# Patient Record
Sex: FEMALE | URBAN - METROPOLITAN AREA
[De-identification: names, ages, dates, MRNs, and addresses within clinical notes are randomized per-mention and may not be internally consistent; named-entity substitution may affect disease eponyms.]

---

## 2019-06-20 ENCOUNTER — APPOINTMENT (OUTPATIENT)
Age: 54
Setting detail: DERMATOLOGY
End: 2019-06-25

## 2019-06-20 VITALS — HEIGHT: 61 IN | WEIGHT: 165 LBS | RESPIRATION RATE: 16 BRPM

## 2019-06-20 DIAGNOSIS — B07.8 OTHER VIRAL WARTS: ICD-10-CM

## 2019-06-20 DIAGNOSIS — L91.0 HYPERTROPHIC SCAR: ICD-10-CM

## 2019-06-20 DIAGNOSIS — D22 MELANOCYTIC NEVI: ICD-10-CM

## 2019-06-20 DIAGNOSIS — M67.4 GANGLION: ICD-10-CM

## 2019-06-20 DIAGNOSIS — L57.0 ACTINIC KERATOSIS: ICD-10-CM

## 2019-06-20 DIAGNOSIS — L81.4 OTHER MELANIN HYPERPIGMENTATION: ICD-10-CM

## 2019-06-20 PROBLEM — L11.1 TRANSIENT ACANTHOLYTIC DERMATOSIS [GROVER]: Status: ACTIVE | Noted: 2019-06-20

## 2019-06-20 PROBLEM — D48.5 NEOPLASM OF UNCERTAIN BEHAVIOR OF SKIN: Status: ACTIVE | Noted: 2019-06-20

## 2019-06-20 PROBLEM — D22.5 MELANOCYTIC NEVI OF TRUNK: Status: ACTIVE | Noted: 2019-06-20

## 2019-06-20 PROBLEM — M67.431 GANGLION, RIGHT WRIST: Status: ACTIVE | Noted: 2019-06-20

## 2019-06-20 PROCEDURE — 17000 DESTRUCT PREMALG LESION: CPT | Mod: 59

## 2019-06-20 PROCEDURE — 99214 OFFICE O/P EST MOD 30 MIN: CPT | Mod: 25

## 2019-06-20 PROCEDURE — OTHER INTRALESIONAL KENALOG: OTHER

## 2019-06-20 PROCEDURE — OTHER COUNSELING: OTHER

## 2019-06-20 PROCEDURE — 56605 BIOPSY OF VULVA/PERINEUM: CPT

## 2019-06-20 PROCEDURE — 17003 DESTRUCT PREMALG LES 2-14: CPT | Mod: 59

## 2019-06-20 PROCEDURE — 88305 TISSUE EXAM BY PATHOLOGIST: CPT

## 2019-06-20 PROCEDURE — OTHER LIQUID NITROGEN: OTHER

## 2019-06-20 PROCEDURE — 11900 INJECT SKIN LESIONS </W 7: CPT | Mod: 59

## 2019-06-20 PROCEDURE — 11102 TANGNTL BX SKIN SINGLE LES: CPT | Mod: 59

## 2019-06-20 PROCEDURE — OTHER PATHOLOGY BILLING: OTHER

## 2019-06-20 PROCEDURE — OTHER BIOPSY BY SHAVE METHOD: OTHER

## 2019-06-20 PROCEDURE — 17110 DESTRUCT B9 LESION 1-14: CPT

## 2019-06-20 ASSESSMENT — LOCATION SIMPLE DESCRIPTION DERM
LOCATION SIMPLE: LEFT FOREHEAD
LOCATION SIMPLE: LEFT UPPER BACK
LOCATION SIMPLE: RIGHT PRETIBIAL REGION
LOCATION SIMPLE: LEFT SMALL FINGER
LOCATION SIMPLE: GROIN
LOCATION SIMPLE: RIGHT WRIST
LOCATION SIMPLE: RIGHT CHEEK
LOCATION SIMPLE: LEFT PRETIBIAL REGION
LOCATION SIMPLE: LEFT RING FINGER
LOCATION SIMPLE: LEFT POSTERIOR UPPER ARM
LOCATION SIMPLE: RIGHT ANTERIOR NECK

## 2019-06-20 ASSESSMENT — LOCATION DETAILED DESCRIPTION DERM
LOCATION DETAILED: RIGHT PROXIMAL PRETIBIAL REGION
LOCATION DETAILED: LEFT SUPERIOR UPPER BACK
LOCATION DETAILED: RIGHT VENTRAL WRIST
LOCATION DETAILED: LEFT PROXIMAL POSTERIOR UPPER ARM
LOCATION DETAILED: LEFT PROXIMAL RADIAL DORSAL SMALL FINGER
LOCATION DETAILED: RIGHT INFERIOR ANTERIOR NECK
LOCATION DETAILED: LEFT FOREHEAD
LOCATION DETAILED: LEFT PROXIMAL PRETIBIAL REGION
LOCATION DETAILED: MONS PUBIS
LOCATION DETAILED: LEFT DISTAL PALMAR RING FINGER
LOCATION DETAILED: LEFT SMALL FINGER PROXIMAL INTERPHALANGEAL JOINT
LOCATION DETAILED: RIGHT LATERAL BUCCAL CHEEK

## 2019-06-20 ASSESSMENT — LOCATION ZONE DERM
LOCATION ZONE: TRUNK
LOCATION ZONE: VULVA
LOCATION ZONE: FINGER
LOCATION ZONE: LEG
LOCATION ZONE: FACE
LOCATION ZONE: NECK
LOCATION ZONE: ARM

## 2019-06-20 NOTE — PROCEDURE: LIQUID NITROGEN
Detail Level: Detailed
Medical Necessity Clause: This procedure was medically necessary because the lesions that were treated were:
Render Note In Bullet Format When Appropriate: No
Consent: Verbal and written consent was obtained, and risks were reviewed prior to procedure today. Risks discussed include but are not limited to pain, crusting, scabbing, blistering, scarring, temporary or permanent darker or lighter pigmentary change, recurrence, incomplete resolution, and infection.
Post-Care Instructions: Patient was instructed to avoid picking at any of the treated lesions. Should any lesions treated today not be resolved within 8 weeks or if not certain, then return to clinic for re-evaluation.
Include Z78.9 (Other Specified Conditions Influencing Health Status) As An Associated Diagnosis?: Yes
Consent: Discussed that these are a result of cumulative sun exposure. Verbal and written consent was obtained, and risks were reviewed prior to procedure today. Risks discussed include but are not limited to pain, crusting, scabbing, blistering, scarring, temporary or permanent darker or lighter pigmentary change, recurrence, incomplete resolution, and infection.
Medical Necessity Information: It is in your best interest to select a reason for this procedure from the list below. All of these items fulfill various CMS LCD requirements except the new and changing color options.
Duration Of Freeze Thaw-Cycle (Seconds): 0
Post-Care Instructions: Avoid picking at any of the treated lesions.

## 2019-06-20 NOTE — PROCEDURE: COUNSELING
Patient Specific Counseling (Will Not Stick From Patient To Patient): - This keloid is from a surgical treatment of a skin cancer BCC.\\n- Recommend cortisone injection today.

## 2019-06-20 NOTE — PROCEDURE: COUNSELING
Patient Specific Counseling (Will Not Stick From Patient To Patient): - Refer to ortho doctor. Reassured benign.\\n- Patient expressed understanding.

## 2019-06-20 NOTE — PROCEDURE: PATHOLOGY BILLING
Immunohistochemistry ([id number] and [id number]) billing is not performed here. Please use the Immunohistochemistry Stain Billing plan to accomplish this. Immunohistochemistry ([id number] and [id number]) billing is not performed here. Please use the Immunohistochemistry Stain Billing plan to accomplish this.

## 2019-06-20 NOTE — PROCEDURE: COUNSELING
Patient Specific Counseling (Will Not Stick From Patient To Patient): - Discussed and reassured benign, discussed etiology, treatment options and prognosis of Westhoff’s disease.\\n- If this is itchy, she may use the Betamethasone lotion she has at home to these areas. If not bothersome, may leave alone.\\n- Patient expressed understanding. Patient Specific Counseling (Will Not Stick From Patient To Patient): - Discussed and reassured benign, discussed etiology, treatment options and prognosis of Drayton’s disease.\\n- If this is itchy, she may use the Betamethasone lotion she has at home to these areas. If not bothersome, may leave alone.\\n- Patient expressed understanding.

## 2019-06-20 NOTE — PROCEDURE: COUNSELING
Patient Specific Counseling (Will Not Stick From Patient To Patient): - Recommend treatment of ln2 to the warts.

## 2019-06-20 NOTE — PROCEDURE: BIOPSY BY SHAVE METHOD
Biopsy Type: H and E
Additional Anesthesia Volume In Cc (Will Not Render If 0): 0
Type Of Destruction Used: Curettage
Electrodesiccation Text: The wound bed was treated with electrodesiccation after the biopsy was performed.
Detail Level: Detailed
Biopsy Method: Dermablade
Hemostasis: Drysol
Silver Nitrate Text: The wound bed was treated with silver nitrate after the biopsy was performed.
Electrodesiccation And Curettage Text: The wound bed was treated with electrodesiccation and curettage after the biopsy was performed.
Post-Care Instructions: WOUND CARE:\\nDo NOT submerge wound in a bath, swimming pool, or hot tub for at least 1 week or for as long as there is an open wound. Gently cleanse the site daily with mild soap and water. Be careful NOT to allow a forceful stream of water to hit the biopsy site. After cleaning/showering, apply a thin layer of petrolatum ointment or Aquaphor in the wound followed by an adhesive bandage. Continue this process daily until healed. \\n\\nBLEEDING:\\nIf you develop persistent bleeding, apply firm and steady pressure over the dressing with gauze for 15 minutes. If bleeding persists, reapply pressure with an ice pack over the gauze for 15 minutes. NEVER APPLY ICE DIRECTLY TO THE SKIN. Do NOT peek under the gauze during these 15 minutes of pressure.  Call our office at [phone] if you cannot get the bleeding to stop. \\n\\nINFECTION:\\nSigns of infection may include increasing rather than decreasing pain (after the first few days), increasing redness/swelling/heat, draining pus, pink/red streaks around the wound, and fever or chills.  Please call our office immediately at [phone] if infection is suspected. It is normal to have some mild redness on or around the wound edges; this will lighten day by day and will become less tender.\\n\\nPAIN:\\nPain is usually minimal, but if needed you may take acetaminophen (Tylenol) every four hours as needed. Applying an ice pack over the dressing for 15-20 minutes every 2-3 hours will relieve swelling, lessen pain, and help minimize bruising. NEVER APPLY ICE DIRECTLY TO THE SKIN. Avoid ibuprofen (Advil, Motrin) and naproxen (Aleve) for the first 48 hours as these can increase bleeding.\\n\\nSWELLIG AND BRUISING:\\nSwelling and bruising are common and temporary, usually lasting 1 - 2 weeks. It is more common in areas treated around the eyes, hands, and feet. In the days following the procedure, swelling and bruising can be minimized by keeping the affected areas elevated when possible, reducing salty foods, and applying ice packs over the dressing for 15-20 minutes every 2-3 hours. NEVER APPLY ICE DIRECTLY TO THE SKIN.\\n\\nITCHING:\\nItchiness on and around the wound is very common and can last several days to weeks after surgery. Mild itch is normal as the wound is healing. \\n\\nNERVE CHANGES:\\nNumbness is usually temporary, but it may last for several weeks to months. You may also experience sharp pains at the wound sight as it heals.  Mild pain is normal and will gradually improve with time.\\n \\nNO SMOKING:\\nSmoking will delay the healing process. If you smoke, we recommend trying to quit or at minimum reduce how much you smoke following a procedure.
Bill For Surgical Tray: no
Notification Instructions: - It can take up to 2 weeks to get a biopsy result. \\n- Please refrain from calling to request results until after 2 weeks.
Cryotherapy Text: The wound bed was treated with cryotherapy after the biopsy was performed.
Anesthesia Type: 2% lidocaine with epinephrine
Path Notes Override (Will Replace All Of The Above Text): NROD
Depth Of Biopsy: dermis
Wound Care: Petrolatum
Consent: - Verbal and written consent was obtained and risks were reviewed prior to procedure today. \\n- Risks discussed include but are not limited to scarring, infection, bleeding, scabbing, incomplete removal, nerve damage, pain, and allergy to anesthesia.
Billing Type: Third-Party Bill
Anesthesia Volume In Cc (Will Not Render If 0): 0.3
Dressing: bandage
Post-Care Instructions: WOUND CARE:\\nDo NOT submerge wound in a bath, swimming pool, or hot tub for at least 1 week or for as long as there is an open wound. Gently cleanse the site daily with mild soap and water. Be careful NOT to allow a forceful stream of water to hit the biopsy site. After cleaning/showering, apply a thin layer of petrolatum ointment or Aquaphor in the wound followed by an adhesive bandage. Continue this process daily until healed. \\n\\nBLEEDING:\\nIf you develop persistent bleeding, apply firm and steady pressure over the dressing with gauze for 15 minutes. If bleeding persists, reapply pressure with an ice pack over the gauze for 15 minutes. NEVER APPLY ICE DIRECTLY TO THE SKIN. Do NOT peek under the gauze during these 15 minutes of pressure.  Call our office at [phone] if you cannot get the bleeding to stop. \\n\\nINFECTION:\\nSigns of infection may include increasing rather than decreasing pain (after the first few days), increasing redness/swelling/heat, draining pus, pink/red streaks around the wound, and fever or chills.  Please call our office immediately at [phone] if infection is suspected. It is normal to have some mild redness on or around the wound edges; this will lighten day by day and will become less tender.\\n\\nPAIN:\\nPain is usually minimal, but if needed you may take acetaminophen (Tylenol) every four hours as needed. Applying an ice pack over the dressing for 15-20 minutes every 2-3 hours will relieve swelling, lessen pain, and help minimize bruising. NEVER APPLY ICE DIRECTLY TO THE SKIN. Avoid ibuprofen (Advil, Motrin) and naproxen (Aleve) for the first 48 hours as these can increase bleeding.\\n\\nSWELLIG AND BRUISING:\\nSwelling and bruising are common and temporary, usually lasting 1 - 2 weeks. It is more common in areas treated around the eyes, hands, and feet. In the days following the procedure, swelling and bruising can be minimized by keeping the affected areas elevated when possible, reducing salty foods, and applying ice packs over the dressing for 15-20 minutes every 2-3 hours. NEVER APPLY ICE DIRECTLY TO THE SKIN.\\n\\nITCHING:\\nItchiness on and around the wound is very common and can last several days to weeks after surgery. Mild itch is normal as the wound is healing. \\n\\nNERVE CHANGES:\\nNumbness is usually temporary, but it may last for several weeks to months. You may also experience sharp pains at the wound sight as it heals.  Mild pain is normal and will gradually improve with time.\\n \\nNO SMOKING:\\nSmoking will delay the healing process. If you smoke, we recommend trying to quit or at minimum reduce how much you smoke following a procedure.
Was A Bandage Applied: Yes
Curettage Text: The wound bed was treated with curettage after the biopsy was performed.
Path Notes Override (Will Replace All Of The Above Text): NROD

## 2019-12-27 ENCOUNTER — APPOINTMENT (OUTPATIENT)
Age: 54
Setting detail: DERMATOLOGY
End: 2019-12-31

## 2019-12-27 VITALS — RESPIRATION RATE: 14 BRPM | HEIGHT: 61 IN | WEIGHT: 164 LBS

## 2019-12-27 DIAGNOSIS — L82.0 INFLAMED SEBORRHEIC KERATOSIS: ICD-10-CM

## 2019-12-27 DIAGNOSIS — Z80.8 FAMILY HISTORY OF MALIGNANT NEOPLASM OF OTHER ORGANS OR SYSTEMS: ICD-10-CM

## 2019-12-27 DIAGNOSIS — D22 MELANOCYTIC NEVI: ICD-10-CM

## 2019-12-27 DIAGNOSIS — L73.8 OTHER SPECIFIED FOLLICULAR DISORDERS: ICD-10-CM

## 2019-12-27 DIAGNOSIS — L81.4 OTHER MELANIN HYPERPIGMENTATION: ICD-10-CM

## 2019-12-27 DIAGNOSIS — L40.4 GUTTATE PSORIASIS: ICD-10-CM

## 2019-12-27 DIAGNOSIS — Z85.828 PERSONAL HISTORY OF OTHER MALIGNANT NEOPLASM OF SKIN: ICD-10-CM

## 2019-12-27 PROBLEM — D22.5 MELANOCYTIC NEVI OF TRUNK: Status: ACTIVE | Noted: 2019-12-27

## 2019-12-27 PROCEDURE — OTHER BENIGN DESTRUCTION: OTHER

## 2019-12-27 PROCEDURE — OTHER COUNSELING: OTHER

## 2019-12-27 PROCEDURE — 17110 DESTRUCT B9 LESION 1-14: CPT

## 2019-12-27 PROCEDURE — OTHER REASSURANCE: OTHER

## 2019-12-27 PROCEDURE — OTHER PRESCRIPTION: OTHER

## 2019-12-27 PROCEDURE — 99214 OFFICE O/P EST MOD 30 MIN: CPT | Mod: 25

## 2019-12-27 RX ORDER — CLOBETASOL PROPIONATE 0.5 MG/G
0.05% CREAM TOPICAL BID
Qty: 1 | Refills: 0 | Status: ERX | COMMUNITY
Start: 2019-12-27

## 2019-12-27 ASSESSMENT — LOCATION DETAILED DESCRIPTION DERM
LOCATION DETAILED: RIGHT CLAVICULAR SKIN
LOCATION DETAILED: RIGHT LATERAL FOREHEAD
LOCATION DETAILED: LEFT AXILLARY VAULT
LOCATION DETAILED: RIGHT SUPERIOR UPPER BACK
LOCATION DETAILED: LEFT PROXIMAL PRETIBIAL REGION
LOCATION DETAILED: LEFT SUPERIOR UPPER BACK
LOCATION DETAILED: LEFT POSTERIOR AXILLA
LOCATION DETAILED: RIGHT SUPERIOR MEDIAL MIDBACK

## 2019-12-27 ASSESSMENT — LOCATION ZONE DERM
LOCATION ZONE: TRUNK
LOCATION ZONE: FACE
LOCATION ZONE: LEG
LOCATION ZONE: AXILLAE

## 2019-12-27 ASSESSMENT — LOCATION SIMPLE DESCRIPTION DERM
LOCATION SIMPLE: RIGHT CLAVICULAR SKIN
LOCATION SIMPLE: LEFT PRETIBIAL REGION
LOCATION SIMPLE: LEFT UPPER BACK
LOCATION SIMPLE: RIGHT FOREHEAD
LOCATION SIMPLE: LEFT AXILLARY VAULT
LOCATION SIMPLE: RIGHT UPPER BACK
LOCATION SIMPLE: LEFT POSTERIOR AXILLA
LOCATION SIMPLE: RIGHT LOWER BACK

## 2019-12-27 NOTE — PROCEDURE: REASSURANCE
Detail Level: Generalized
Hide Additional Notes?: No
Detail Level: Detailed
Additional Notes (Optional): Monitor

## 2019-12-27 NOTE — PROCEDURE: BENIGN DESTRUCTION
Add 52 Modifier (Optional): no
Medical Necessity Information: It is in your best interest to select a reason for this procedure from the list below. All of these items fulfill various CMS LCD requirements except the new and changing color options.
Post-Care Instructions: I reviewed with the patient in detail post-care instructions. Patient is to wear sunprotection, and avoid picking at any of the treated lesions. Pt may apply Vaseline to crusted or scabbing areas.
Medical Necessity Clause: This procedure was medically necessary because the lesions that were treated were:
Consent: The patient's consent was obtained including but not limited to risks of crusting, scabbing, blistering, scarring, darker or lighter pigmentary change, recurrence, incomplete removal and infection.
Anesthesia Volume In Cc: 0.5
Detail Level: Zone
Total Number Of Lesions Treated: 6

## 2020-09-10 ENCOUNTER — APPOINTMENT (OUTPATIENT)
Dept: URBAN - METROPOLITAN AREA CLINIC 254 | Age: 55
Setting detail: DERMATOLOGY
End: 2020-09-10

## 2020-09-10 VITALS — WEIGHT: 153 LBS | RESPIRATION RATE: 16 BRPM | HEIGHT: 61 IN

## 2020-09-10 DIAGNOSIS — L82.1 OTHER SEBORRHEIC KERATOSIS: ICD-10-CM

## 2020-09-10 DIAGNOSIS — Z85.828 PERSONAL HISTORY OF OTHER MALIGNANT NEOPLASM OF SKIN: ICD-10-CM

## 2020-09-10 DIAGNOSIS — D22 MELANOCYTIC NEVI: ICD-10-CM

## 2020-09-10 DIAGNOSIS — L81.4 OTHER MELANIN HYPERPIGMENTATION: ICD-10-CM

## 2020-09-10 DIAGNOSIS — L663 OTHER SPECIFIED DISEASES OF HAIR AND HAIR FOLLICLES: ICD-10-CM

## 2020-09-10 DIAGNOSIS — L82.0 INFLAMED SEBORRHEIC KERATOSIS: ICD-10-CM

## 2020-09-10 DIAGNOSIS — D18.0 HEMANGIOMA: ICD-10-CM

## 2020-09-10 DIAGNOSIS — L91.8 OTHER HYPERTROPHIC DISORDERS OF THE SKIN: ICD-10-CM

## 2020-09-10 DIAGNOSIS — L73.8 OTHER SPECIFIED FOLLICULAR DISORDERS: ICD-10-CM

## 2020-09-10 PROBLEM — D18.01 HEMANGIOMA OF SKIN AND SUBCUTANEOUS TISSUE: Status: ACTIVE | Noted: 2020-09-10

## 2020-09-10 PROBLEM — D22.5 MELANOCYTIC NEVI OF TRUNK: Status: ACTIVE | Noted: 2020-09-10

## 2020-09-10 PROBLEM — L30.9 DERMATITIS, UNSPECIFIED: Status: ACTIVE | Noted: 2020-09-10

## 2020-09-10 PROCEDURE — 99214 OFFICE O/P EST MOD 30 MIN: CPT

## 2020-09-10 PROCEDURE — OTHER BENIGN DESTRUCTION COSMETIC: OTHER

## 2020-09-10 PROCEDURE — OTHER COUNSELING: OTHER

## 2020-09-10 ASSESSMENT — LOCATION DETAILED DESCRIPTION DERM
LOCATION DETAILED: RIGHT AXILLARY VAULT
LOCATION DETAILED: SUPERIOR THORACIC SPINE
LOCATION DETAILED: LEFT AXILLARY VAULT
LOCATION DETAILED: LEFT LOWER CUTANEOUS LIP
LOCATION DETAILED: RIGHT INFERIOR MEDIAL FOREHEAD
LOCATION DETAILED: LEFT AXILLARY VAULT
LOCATION DETAILED: EPIGASTRIC SKIN
LOCATION DETAILED: RIGHT MEDIAL UPPER BACK
LOCATION DETAILED: UPPER STERNUM
LOCATION DETAILED: LEFT INFERIOR ANTERIOR NECK
LOCATION DETAILED: LEFT SUPERIOR UPPER BACK
LOCATION DETAILED: RIGHT AXILLARY VAULT
LOCATION DETAILED: RIGHT MEDIAL SUPERIOR CHEST

## 2020-09-10 ASSESSMENT — LOCATION ZONE DERM
LOCATION ZONE: AXILLAE
LOCATION ZONE: TRUNK
LOCATION ZONE: AXILLAE
LOCATION ZONE: FACE
LOCATION ZONE: LIP
LOCATION ZONE: NECK

## 2020-09-10 ASSESSMENT — LOCATION SIMPLE DESCRIPTION DERM
LOCATION SIMPLE: LEFT ANTERIOR NECK
LOCATION SIMPLE: UPPER BACK
LOCATION SIMPLE: RIGHT AXILLARY VAULT
LOCATION SIMPLE: RIGHT FOREHEAD
LOCATION SIMPLE: CHEST
LOCATION SIMPLE: ABDOMEN
LOCATION SIMPLE: RIGHT UPPER BACK
LOCATION SIMPLE: RIGHT AXILLARY VAULT
LOCATION SIMPLE: LEFT AXILLARY VAULT
LOCATION SIMPLE: LEFT LIP
LOCATION SIMPLE: LEFT AXILLARY VAULT
LOCATION SIMPLE: LEFT UPPER BACK

## 2020-09-10 NOTE — PROCEDURE: COUNSELING
Detail Level: Zone
Detail Level: Detailed
Patient Specific Counseling (Will Not Stick From Patient To Patient): -Recommend using Dial antibacterial soap for a few weeks to wash the underarms.\\n-RTC in 4 weeks to recheck this rash.

## 2020-09-10 NOTE — PROCEDURE: BENIGN DESTRUCTION COSMETIC
Post-Care Instructions: I reviewed with the patient in detail post-care instructions. Patient is to wear sunprotection, and avoid picking at any of the treated lesions. Pt may apply Vaseline to crusted or scabbing areas.
Detail Level: Detailed
Consent: The patient's consent was obtained including but not limited to risks of crusting, scabbing, blistering, scarring, darker or lighter pigmentary change, recurrence, incomplete removal and infection.
Anesthesia Volume In Cc: 0.5
Price (Use Numbers Only, No Special Characters Or $): 125

## 2021-06-03 ENCOUNTER — APPOINTMENT (OUTPATIENT)
Dept: URBAN - METROPOLITAN AREA CLINIC 254 | Age: 56
Setting detail: DERMATOLOGY
End: 2021-06-06

## 2021-06-03 VITALS — RESPIRATION RATE: 14 BRPM | WEIGHT: 164 LBS | HEIGHT: 61 IN

## 2021-06-03 DIAGNOSIS — Z80.8 FAMILY HISTORY OF MALIGNANT NEOPLASM OF OTHER ORGANS OR SYSTEMS: ICD-10-CM

## 2021-06-03 DIAGNOSIS — L81.4 OTHER MELANIN HYPERPIGMENTATION: ICD-10-CM

## 2021-06-03 DIAGNOSIS — D22 MELANOCYTIC NEVI: ICD-10-CM

## 2021-06-03 DIAGNOSIS — L82.0 INFLAMED SEBORRHEIC KERATOSIS: ICD-10-CM

## 2021-06-03 DIAGNOSIS — Z85.828 PERSONAL HISTORY OF OTHER MALIGNANT NEOPLASM OF SKIN: ICD-10-CM

## 2021-06-03 DIAGNOSIS — Z71.89 OTHER SPECIFIED COUNSELING: ICD-10-CM

## 2021-06-03 DIAGNOSIS — L82.1 OTHER SEBORRHEIC KERATOSIS: ICD-10-CM

## 2021-06-03 PROBLEM — D48.5 NEOPLASM OF UNCERTAIN BEHAVIOR OF SKIN: Status: ACTIVE | Noted: 2021-06-03

## 2021-06-03 PROBLEM — D22.5 MELANOCYTIC NEVI OF TRUNK: Status: ACTIVE | Noted: 2021-06-03

## 2021-06-03 PROCEDURE — 88305 TISSUE EXAM BY PATHOLOGIST: CPT

## 2021-06-03 PROCEDURE — OTHER PATHOLOGY BILLING: OTHER

## 2021-06-03 PROCEDURE — OTHER COUNSELING: OTHER

## 2021-06-03 PROCEDURE — OTHER BIOPSY BY SHAVE METHOD: OTHER

## 2021-06-03 PROCEDURE — 17110 DESTRUCT B9 LESION 1-14: CPT

## 2021-06-03 PROCEDURE — OTHER LIQUID NITROGEN: OTHER

## 2021-06-03 PROCEDURE — 11102 TANGNTL BX SKIN SINGLE LES: CPT | Mod: 59

## 2021-06-03 PROCEDURE — 99213 OFFICE O/P EST LOW 20 MIN: CPT | Mod: 25

## 2021-06-03 ASSESSMENT — LOCATION SIMPLE DESCRIPTION DERM
LOCATION SIMPLE: RIGHT SHOULDER
LOCATION SIMPLE: LEFT UPPER BACK
LOCATION SIMPLE: RIGHT ANTERIOR NECK
LOCATION SIMPLE: RIGHT BACK
LOCATION SIMPLE: RIGHT THIGH
LOCATION SIMPLE: RIGHT UPPER BACK
LOCATION SIMPLE: CHEST

## 2021-06-03 ASSESSMENT — LOCATION DETAILED DESCRIPTION DERM
LOCATION DETAILED: UPPER STERNUM
LOCATION DETAILED: RIGHT SUPERIOR LATERAL UPPER BACK
LOCATION DETAILED: RIGHT INFERIOR ANTERIOR NECK
LOCATION DETAILED: RIGHT MEDIAL UPPER BACK
LOCATION DETAILED: LEFT SUPERIOR UPPER BACK
LOCATION DETAILED: RIGHT ANTERIOR SHOULDER
LOCATION DETAILED: RIGHT ANTERIOR PROXIMAL THIGH

## 2021-06-03 ASSESSMENT — LOCATION ZONE DERM
LOCATION ZONE: ARM
LOCATION ZONE: LEG
LOCATION ZONE: TRUNK
LOCATION ZONE: NECK

## 2021-06-03 NOTE — PROCEDURE: LIQUID NITROGEN
Render Note In Bullet Format When Appropriate: Yes
Detail Level: Detailed
Medical Necessity Information: It is in your best interest to select a reason for this procedure from the list below. All of these items fulfill various CMS LCD requirements except the new and changing color options.
Medical Necessity Clause: This procedure was medically necessary because the lesions that were treated were:
Consent: - Verbal and written consent was obtained, and risks were reviewed prior to procedure today. \\n- Risks discussed include but are not limited to pain, crusting, scabbing, blistering, scarring, temporary or permanent darker or lighter pigmentary change, recurrence, incomplete resolution, and infection.
Post-Care Instructions: - Avoid picking at any of the treated lesions.
Include Z78.9 (Other Specified Conditions Influencing Health Status) As An Associated Diagnosis?: No

## 2021-06-03 NOTE — PROCEDURE: PATHOLOGY BILLING
Immunohistochemistry ([id number] and [id number]) billing is not performed here. Please use the Immunohistochemistry Stain Billing plan to accomplish this. Immunohistochemistry ([id number] and [id number]) billing is not performed here. Please use the Immunohistochemistry Stain Billing plan to accomplish this.

## 2021-06-03 NOTE — PROCEDURE: BIOPSY BY SHAVE METHOD
Curettage Text: The wound bed was treated with curettage after the biopsy was performed.
Bill [id number] For Specimen Handling/Conveyance To Laboratory?: no
Hide Additional Anticipated Plan?: Yes
Path Notes Override (Will Replace All Of The Above Text): NROD
Biopsy Method: Dermablade
Electrodesiccation Text: The wound bed was treated with electrodesiccation after the biopsy was performed.
Depth Of Biopsy: dermis
Detail Level: Detailed
Silver Nitrate Text: The wound bed was treated with silver nitrate after the biopsy was performed.
Wound Care: Petrolatum
Biopsy Type: H and E
Dressing: bandage
Billing Type: Client Bill
Notification Instructions: - It can take up to 2 weeks to get a biopsy result. \\n- Please refrain from calling to request results until after 2 weeks.
Electrodesiccation And Curettage Text: The wound bed was treated with electrodesiccation and curettage after the biopsy was performed.
Anesthesia Type: 2% lidocaine with epinephrine
Additional Anesthesia Volume In Cc (Will Not Render If 0): 0
Anesthesia Volume In Cc (Will Not Render If 0): 0.5
Information: Selecting Yes will display possible errors in your note based on the variables you have selected. This validation is only offered as a suggestion for you. PLEASE NOTE THAT THE VALIDATION TEXT WILL BE REMOVED WHEN YOU FINALIZE YOUR NOTE. IF YOU WANT TO FAX A PRELIMINARY NOTE YOU WILL NEED TO TOGGLE THIS TO 'NO' IF YOU DO NOT WANT IT IN YOUR FAXED NOTE.
Cryotherapy Text: The wound bed was treated with cryotherapy after the biopsy was performed.
Hemostasis: Aluminum Chloride
Post-Care Instructions: WOUND CARE:\\nDo NOT submerge wound in a bath, swimming pool, or hot tub for at least 1 week or for as long as there is an open wound. Gently cleanse the site daily with mild soap and water. Be careful NOT to allow a forceful stream of water to hit the biopsy site. After cleaning/showering, apply a thin layer of petrolatum ointment or Aquaphor in the wound followed by an adhesive bandage. Continue this process daily until healed. \\n\\nBLEEDING:\\nIf you develop persistent bleeding, apply firm and steady pressure over the dressing with gauze for 15 minutes. If bleeding persists, reapply pressure with an ice pack over the gauze for 15 minutes. NEVER APPLY ICE DIRECTLY TO THE SKIN. Do NOT peek under the gauze during these 15 minutes of pressure.  Call our office at [phone] if you cannot get the bleeding to stop. \\n\\nINFECTION:\\nSigns of infection may include increasing rather than decreasing pain (after the first few days), increasing redness/swelling/heat, draining pus, pink/red streaks around the wound, and fever or chills.  Please call our office immediately at [phone] if infection is suspected. It is normal to have some mild redness on or around the wound edges; this will lighten day by day and will become less tender.\\n\\nPAIN:\\nPain is usually minimal, but if needed you may take acetaminophen (Tylenol) every four hours as needed. Applying an ice pack over the dressing for 15-20 minutes every 2-3 hours will relieve swelling, lessen pain, and help minimize bruising. NEVER APPLY ICE DIRECTLY TO THE SKIN. Avoid ibuprofen (Advil, Motrin) and naproxen (Aleve) for the first 48 hours as these can increase bleeding.\\n\\nSWELLIG AND BRUISING:\\nSwelling and bruising are common and temporary, usually lasting 1 - 2 weeks. It is more common in areas treated around the eyes, hands, and feet. In the days following the procedure, swelling and bruising can be minimized by keeping the affected areas elevated when possible, reducing salty foods, and applying ice packs over the dressing for 15-20 minutes every 2-3 hours. NEVER APPLY ICE DIRECTLY TO THE SKIN.\\n\\nITCHING:\\nItchiness on and around the wound is very common and can last several days to weeks after surgery. Mild itch is normal as the wound is healing. \\n\\nNERVE CHANGES:\\nNumbness is usually temporary, but it may last for several weeks to months. You may also experience sharp pains at the wound sight as it heals.  Mild pain is normal and will gradually improve with time.\\n \\nNO SMOKING:\\nSmoking will delay the healing process. If you smoke, we recommend trying to quit or at minimum reduce how much you smoke following a procedure.
Type Of Destruction Used: Electrodesiccation
Consent: - Verbal and written consent was obtained and risks were reviewed prior to procedure today. \\n- Risks discussed include but are not limited to scarring, infection, bleeding, scabbing, incomplete removal, nerve damage, pain, and allergy to anesthesia.

## 2021-06-03 NOTE — PROCEDURE: PATHOLOGY BILLING
Rendering Text In Billing: The slides will be read by [facility] and reported in the attached document. Rendering Text In Billing: The slides will be read by [facility] and reported in the attached document.

## 2021-06-03 NOTE — PROCEDURE: COUNSELING
Patient Specific Counseling (Will Not Stick From Patient To Patient): - Discussed that this nevus has atypical features that warrant a biopsy.\\n- Patient expressed understanding.\\n- Follow-up for re-examination for regimentation or an additional removal procedure may become necessary depending the pathologist's report.

## 2022-05-03 ENCOUNTER — APPOINTMENT (OUTPATIENT)
Dept: URBAN - METROPOLITAN AREA CLINIC 252 | Age: 57
Setting detail: DERMATOLOGY
End: 2022-05-03

## 2022-05-03 VITALS — HEIGHT: 61 IN | WEIGHT: 165 LBS

## 2022-05-03 DIAGNOSIS — L40.0 PSORIASIS VULGARIS: ICD-10-CM

## 2022-05-03 DIAGNOSIS — D22 MELANOCYTIC NEVI: ICD-10-CM

## 2022-05-03 DIAGNOSIS — L82.1 OTHER SEBORRHEIC KERATOSIS: ICD-10-CM

## 2022-05-03 PROBLEM — L30.9 DERMATITIS, UNSPECIFIED: Status: ACTIVE | Noted: 2022-05-03

## 2022-05-03 PROBLEM — D22.5 MELANOCYTIC NEVI OF TRUNK: Status: ACTIVE | Noted: 2022-05-03

## 2022-05-03 PROCEDURE — 11104 PUNCH BX SKIN SINGLE LESION: CPT

## 2022-05-03 PROCEDURE — OTHER PRESCRIPTION: OTHER

## 2022-05-03 PROCEDURE — OTHER COUNSELING: OTHER

## 2022-05-03 PROCEDURE — OTHER BIOPSY BY PUNCH METHOD: OTHER

## 2022-05-03 PROCEDURE — OTHER ORDER TESTS: OTHER

## 2022-05-03 PROCEDURE — 99213 OFFICE O/P EST LOW 20 MIN: CPT | Mod: 25

## 2022-05-03 PROCEDURE — OTHER OTEZLA INITIATION: OTHER

## 2022-05-03 RX ORDER — TRIAMCINOLONE ACETONIDE 1 MG/G
0.1% CREAM TOPICAL BID
Qty: 454 | Refills: 1 | Status: ERX | COMMUNITY
Start: 2022-05-03

## 2022-05-03 ASSESSMENT — LOCATION DETAILED DESCRIPTION DERM
LOCATION DETAILED: LEFT MID-UPPER BACK
LOCATION DETAILED: LEFT PROXIMAL DORSAL FOREARM
LOCATION DETAILED: RIGHT DISTAL DORSAL FOREARM
LOCATION DETAILED: RIGHT ANTERIOR PROXIMAL THIGH
LOCATION DETAILED: LEFT INFERIOR LATERAL MALAR CHEEK
LOCATION DETAILED: PERIUMBILICAL SKIN
LOCATION DETAILED: LEFT MEDIAL UPPER BACK
LOCATION DETAILED: RIGHT SUPERIOR UPPER BACK
LOCATION DETAILED: LEFT ANTERIOR PROXIMAL THIGH
LOCATION DETAILED: RIGHT PROXIMAL PRETIBIAL REGION

## 2022-05-03 ASSESSMENT — LOCATION SIMPLE DESCRIPTION DERM
LOCATION SIMPLE: LEFT UPPER BACK
LOCATION SIMPLE: LEFT FOREARM
LOCATION SIMPLE: RIGHT FOREARM
LOCATION SIMPLE: LEFT CHEEK
LOCATION SIMPLE: RIGHT PRETIBIAL REGION
LOCATION SIMPLE: ABDOMEN
LOCATION SIMPLE: RIGHT UPPER BACK
LOCATION SIMPLE: LEFT THIGH
LOCATION SIMPLE: RIGHT THIGH

## 2022-05-03 ASSESSMENT — LOCATION ZONE DERM
LOCATION ZONE: FACE
LOCATION ZONE: TRUNK
LOCATION ZONE: LEG
LOCATION ZONE: ARM

## 2022-05-03 NOTE — PROCEDURE: COUNSELING
Patient Specific Counseling (Will Not Stick From Patient To Patient): - Recommended starting Otezla, if resolves on Otezla, then would discontinue and see if pt remains clear after d/c Otezla.
Detail Level: Detailed
Statement Selected
Detail Level: Zone

## 2022-05-03 NOTE — HPI: RASH
Is This A New Presentation, Or A Follow-Up?: Rash
Additional History: Showed up after having Covid April 6th 2022. March 30 is is when rash started. Started on arms and back and now on face and scalp. Used betamethasone and mometasone both for 1 week (2 weeks total) on arms and back. Face rash started 2 weeks ago. Itch is moderate on back and mild. No fever or chills.  Had a rash like this in past she reports in 2013 and diagnosed with psoriasis. Had a post strep rash.

## 2022-06-07 ENCOUNTER — APPOINTMENT (OUTPATIENT)
Dept: URBAN - METROPOLITAN AREA CLINIC 254 | Age: 57
Setting detail: DERMATOLOGY
End: 2022-06-09

## 2022-06-07 VITALS — WEIGHT: 164 LBS | HEIGHT: 61 IN

## 2022-06-07 DIAGNOSIS — Z71.89 OTHER SPECIFIED COUNSELING: ICD-10-CM

## 2022-06-07 DIAGNOSIS — L81.4 OTHER MELANIN HYPERPIGMENTATION: ICD-10-CM

## 2022-06-07 DIAGNOSIS — L82.0 INFLAMED SEBORRHEIC KERATOSIS: ICD-10-CM

## 2022-06-07 DIAGNOSIS — L40.4 GUTTATE PSORIASIS: ICD-10-CM

## 2022-06-07 DIAGNOSIS — Z85.828 PERSONAL HISTORY OF OTHER MALIGNANT NEOPLASM OF SKIN: ICD-10-CM

## 2022-06-07 DIAGNOSIS — L57.0 ACTINIC KERATOSIS: ICD-10-CM

## 2022-06-07 DIAGNOSIS — Z87.2 PERSONAL HISTORY OF DISEASES OF THE SKIN AND SUBCUTANEOUS TISSUE: ICD-10-CM

## 2022-06-07 DIAGNOSIS — D22 MELANOCYTIC NEVI: ICD-10-CM

## 2022-06-07 PROBLEM — D22.71 MELANOCYTIC NEVI OF RIGHT LOWER LIMB, INCLUDING HIP: Status: ACTIVE | Noted: 2022-06-07

## 2022-06-07 PROBLEM — D22.72 MELANOCYTIC NEVI OF LEFT LOWER LIMB, INCLUDING HIP: Status: ACTIVE | Noted: 2022-06-07

## 2022-06-07 PROCEDURE — 17000 DESTRUCT PREMALG LESION: CPT | Mod: 59

## 2022-06-07 PROCEDURE — OTHER COUNSELING: OTHER

## 2022-06-07 PROCEDURE — OTHER OTEZLA MONITORING: OTHER

## 2022-06-07 PROCEDURE — 17003 DESTRUCT PREMALG LES 2-14: CPT | Mod: 59

## 2022-06-07 PROCEDURE — OTHER PRESCRIPTION: OTHER

## 2022-06-07 PROCEDURE — 17110 DESTRUCT B9 LESION 1-14: CPT

## 2022-06-07 PROCEDURE — OTHER PRESCRIPTION SAMPLES PROVIDED: OTHER

## 2022-06-07 PROCEDURE — OTHER LIQUID NITROGEN: OTHER

## 2022-06-07 PROCEDURE — 99214 OFFICE O/P EST MOD 30 MIN: CPT | Mod: 25

## 2022-06-07 RX ORDER — APREMILAST 30 MG/1
30MG TABLET, FILM COATED ORAL TWICE PER DAY
Qty: 60 | Refills: 1 | Status: ERX | COMMUNITY
Start: 2022-06-07

## 2022-06-07 ASSESSMENT — LOCATION DETAILED DESCRIPTION DERM
LOCATION DETAILED: RIGHT SUPERIOR LATERAL BUCCAL CHEEK
LOCATION DETAILED: RIGHT POSTERIOR SHOULDER
LOCATION DETAILED: RIGHT MEDIAL SUPERIOR CHEST
LOCATION DETAILED: RIGHT ANTERIOR PROXIMAL THIGH
LOCATION DETAILED: RIGHT BUTTOCK
LOCATION DETAILED: RIGHT LATERAL BUCCAL CHEEK
LOCATION DETAILED: RIGHT ANTERIOR SHOULDER
LOCATION DETAILED: RIGHT ANTERIOR DISTAL THIGH
LOCATION DETAILED: LEFT ANTERIOR DISTAL THIGH
LOCATION DETAILED: LEFT RIB CAGE
LOCATION DETAILED: LEFT POSTERIOR SHOULDER
LOCATION DETAILED: RIGHT PROXIMAL PRETIBIAL REGION
LOCATION DETAILED: LEFT INFERIOR UPPER BACK
LOCATION DETAILED: LEFT MID-UPPER BACK
LOCATION DETAILED: LEFT SUPERIOR UPPER BACK
LOCATION DETAILED: LEFT PROXIMAL PRETIBIAL REGION
LOCATION DETAILED: UPPER STERNUM
LOCATION DETAILED: SUPERIOR THORACIC SPINE
LOCATION DETAILED: LEFT BUTTOCK

## 2022-06-07 ASSESSMENT — LOCATION SIMPLE DESCRIPTION DERM
LOCATION SIMPLE: ABDOMEN
LOCATION SIMPLE: LEFT PRETIBIAL REGION
LOCATION SIMPLE: UPPER BACK
LOCATION SIMPLE: LEFT THIGH
LOCATION SIMPLE: LEFT UPPER BACK
LOCATION SIMPLE: RIGHT SHOULDER
LOCATION SIMPLE: RIGHT THIGH
LOCATION SIMPLE: CHEST
LOCATION SIMPLE: RIGHT PRETIBIAL REGION
LOCATION SIMPLE: LEFT BUTTOCK
LOCATION SIMPLE: RIGHT CHEEK
LOCATION SIMPLE: LEFT SHOULDER
LOCATION SIMPLE: RIGHT BUTTOCK

## 2022-06-07 ASSESSMENT — LOCATION ZONE DERM
LOCATION ZONE: ARM
LOCATION ZONE: LEG
LOCATION ZONE: FACE
LOCATION ZONE: TRUNK

## 2022-06-07 ASSESSMENT — PGA PSORIASIS: PGA PSORIASIS 2020: MODERATE

## 2022-06-07 NOTE — PROCEDURE: LIQUID NITROGEN
Medical Necessity Information: It is in your best interest to select a reason for this procedure from the list below. All of these items fulfill various CMS LCD requirements except the new and changing color options.
Render Note In Bullet Format When Appropriate: Yes
Medical Necessity Clause: This procedure was medically necessary because the lesions that were treated were:
Detail Level: Detailed
Add 52 Modifier (Optional): no
Post-Care Instructions: - Patient was instructed to avoid picking at any of the treated lesions.
Consent: - Discussed that these are a result of cumulative sun exposure.\\n- Verbal and written consent was obtained, and risks were reviewed prior to procedure today. \\n- Risks discussed include but are not limited to pain, crusting, scabbing, blistering, scarring, temporary or permanent darker or lighter pigmentary change, recurrence, incomplete resolution, and infection.
Consent: - Verbal and written consent was obtained, and risks were reviewed prior to procedure today. \\n- Risks discussed include but are not limited to pain, crusting, scabbing, blistering, scarring, temporary or permanent darker or lighter pigmentary change, recurrence, incomplete resolution, and infection.
Post-Care Instructions: - Avoid picking at any of the treated lesions.
Spray Paint Text: The liquid nitrogen was applied to the skin utilizing a spray paint frosting technique.
Duration Of Freeze Thaw-Cycle (Seconds): 0

## 2022-06-07 NOTE — PROCEDURE: COUNSELING
Detail Level: Simple
Detail Level: Detailed
Patient Specific Counseling (Will Not Stick From Patient To Patient): - Recommended continuing Otezla.
Patient Specific Counseling (Will Not Stick From Patient To Patient): -\\n- discussed 5FU in the future
Skin Checks Recommendations: Rec FBE

## 2022-06-07 NOTE — HPI: FULL BODY SKIN EXAMINATION
What Type Of Note Output Would You Prefer (Optional)?: Bullet Format
What Is The Reason For Today's Visit?: Full Body Skin Examination
What Is The Reason For Today's Visit? (Being Monitored For X): the risk of recurrence of previously treated lesion(s)
Additional History: Patient reports she recently started Otezla for psoriasis. Patient has a few patches on her chest, side of abdomen, and back of her leg that she would like evaluated to see if they are part of her psoriasis or something else. Patient presents for full body skin exam.

## 2022-06-07 NOTE — PROCEDURE: PRESCRIPTION SAMPLES PROVIDED
Detail Level: Zone
Samples Given: 3 Otezla starter sample packs given to patient today\\nSending rx today to start insurance process for Otezla coverage\\nPatient advised to schedule a 4-6 week f/u with ALEKSEY or KATHI

## 2022-06-17 RX ORDER — APREMILAST 30 MG/1
TABLET, FILM COATED ORAL TWICE PER DAY
Qty: 60 | Refills: 1 | Status: ERX

## 2022-07-19 ENCOUNTER — APPOINTMENT (OUTPATIENT)
Dept: URBAN - METROPOLITAN AREA CLINIC 254 | Age: 57
Setting detail: DERMATOLOGY
End: 2022-07-25

## 2022-07-19 DIAGNOSIS — L40.4 GUTTATE PSORIASIS: ICD-10-CM

## 2022-07-19 PROCEDURE — OTHER COUNSELING: OTHER

## 2022-07-19 PROCEDURE — OTHER MIPS QUALITY: OTHER

## 2022-07-19 PROCEDURE — OTHER OTEZLA MONITORING: OTHER

## 2022-07-19 PROCEDURE — OTHER PRESCRIPTION MEDICATION MANAGEMENT: OTHER

## 2022-07-19 PROCEDURE — 99213 OFFICE O/P EST LOW 20 MIN: CPT

## 2022-07-19 ASSESSMENT — LOCATION ZONE DERM: LOCATION ZONE: TRUNK

## 2022-07-19 ASSESSMENT — LOCATION DETAILED DESCRIPTION DERM
LOCATION DETAILED: LEFT RIB CAGE
LOCATION DETAILED: LEFT BUTTOCK
LOCATION DETAILED: RIGHT BUTTOCK
LOCATION DETAILED: SUPERIOR THORACIC SPINE

## 2022-07-19 ASSESSMENT — LOCATION SIMPLE DESCRIPTION DERM
LOCATION SIMPLE: ABDOMEN
LOCATION SIMPLE: LEFT BUTTOCK
LOCATION SIMPLE: RIGHT BUTTOCK
LOCATION SIMPLE: UPPER BACK

## 2022-07-19 NOTE — PROCEDURE: COUNSELING
Patient Specific Counseling (Will Not Stick From Patient To Patient): - Recommended continuing Otezla.
Detail Level: Simple

## 2022-07-19 NOTE — PROCEDURE: PRESCRIPTION MEDICATION MANAGEMENT
Modify Regimen: -Patient is to decrease to one tablet per day.\\n-Patient will check with PCP for reevaluation of kidney values.\\n-Advised patient to continue with sun exposure, with adequate sunscreen application, to assist with further resolution of the psoriasis.\\n-Advised patient that she should continue to take the Otezla for another month, and if she sees further resolve then she should be able to discontinue the Otezla. Patient expressed understanding.
Detail Level: Zone
Render In Strict Bullet Format?: No

## 2022-07-19 NOTE — PROCEDURE: MIPS QUALITY
Quality 110: Preventive Care And Screening: Influenza Immunization: Influenza Immunization Administered during Influenza season
Quality 402: Tobacco Use And Help With Quitting Among Adolescents: Patient screened for tobacco and never smoked
Quality 431: Preventive Care And Screening: Unhealthy Alcohol Use - Screening: Patient not identified as an unhealthy alcohol user when screened for unhealthy alcohol use using a systematic screening method
Detail Level: Detailed
Quality 130: Documentation Of Current Medications In The Medical Record: Current Medications Documented

## 2022-10-04 RX ORDER — APREMILAST 30 MG/1
TABLET, FILM COATED ORAL TWICE PER DAY
Qty: 180 | Refills: 1 | Status: ERX

## 2022-11-15 ENCOUNTER — APPOINTMENT (OUTPATIENT)
Dept: URBAN - METROPOLITAN AREA CLINIC 254 | Age: 57
Setting detail: DERMATOLOGY
End: 2022-11-19

## 2022-11-15 VITALS — HEIGHT: 61 IN | WEIGHT: 166 LBS

## 2022-11-15 DIAGNOSIS — L21.8 OTHER SEBORRHEIC DERMATITIS: ICD-10-CM

## 2022-11-15 DIAGNOSIS — L57.0 ACTINIC KERATOSIS: ICD-10-CM

## 2022-11-15 DIAGNOSIS — L40.4 GUTTATE PSORIASIS: ICD-10-CM

## 2022-11-15 PROCEDURE — 99213 OFFICE O/P EST LOW 20 MIN: CPT

## 2022-11-15 PROCEDURE — OTHER ADDITIONAL NOTES: OTHER

## 2022-11-15 PROCEDURE — OTHER MIPS QUALITY: OTHER

## 2022-11-15 PROCEDURE — OTHER COUNSELING: OTHER

## 2022-11-15 ASSESSMENT — LOCATION SIMPLE DESCRIPTION DERM
LOCATION SIMPLE: LEFT BUTTOCK
LOCATION SIMPLE: RIGHT CHEEK
LOCATION SIMPLE: RIGHT EAR
LOCATION SIMPLE: UPPER BACK
LOCATION SIMPLE: RIGHT BUTTOCK
LOCATION SIMPLE: LEFT EAR
LOCATION SIMPLE: ABDOMEN

## 2022-11-15 ASSESSMENT — LOCATION DETAILED DESCRIPTION DERM
LOCATION DETAILED: LEFT RIB CAGE
LOCATION DETAILED: RIGHT CYMBA CONCHA
LOCATION DETAILED: RIGHT SUPERIOR LATERAL BUCCAL CHEEK
LOCATION DETAILED: RIGHT BUTTOCK
LOCATION DETAILED: LEFT BUTTOCK
LOCATION DETAILED: RIGHT LATERAL BUCCAL CHEEK
LOCATION DETAILED: SUPERIOR THORACIC SPINE
LOCATION DETAILED: LEFT CRUS OF HELIX

## 2022-11-15 ASSESSMENT — LOCATION ZONE DERM
LOCATION ZONE: FACE
LOCATION ZONE: EAR
LOCATION ZONE: TRUNK

## 2022-11-15 ASSESSMENT — SEVERITY ASSESSMENT: HOW SEVERE IS THIS PATIENT'S CONDITION?: MODERATE

## 2022-11-15 ASSESSMENT — PGA PSORIASIS: PGA PSORIASIS 2020: ALMOST CLEAR

## 2022-11-15 NOTE — PROCEDURE: ADDITIONAL NOTES
Additional Notes: -Discussed pt can use Triamcinolone for a week for maintenance. Also can use (otc) Selsun blue/ head and shoulders shampoo as well. She has been OFF of her Otezla since August and her psoriasis has not reoccurred. She does admit to “loading up” on Otezla medication since it was covered and in case her psoriasis recurs.
Detail Level: Simple
Render Risk Assessment In Note?: no

## 2022-11-15 NOTE — PROCEDURE: COUNSELING
Patient Specific Counseling (Will Not Stick From Patient To Patient): - Recommended continuing Otezla only if outbreaks start back up. \\n-Also discussed other treatment such as Biologic injection for Psoriasis.\\n-Recommend pt be careful with Tanning to prevent future damage to skin/skin cancer.\\n-Pt understood and had no further questions nor concerns.
Detail Level: Simple
Patient Specific Counseling (Will Not Stick From Patient To Patient): -Discussed 5FU in the future for maintenance.\\n-Pt understood treatment and had no concerns nor questions.
Detail Level: Detailed

## 2022-11-15 NOTE — PROCEDURE: MIPS QUALITY
Quality 110: Preventive Care And Screening: Influenza Immunization: Influenza Immunization Administered during Influenza season
Quality 130: Documentation Of Current Medications In The Medical Record: Current Medications Documented
Quality 431: Preventive Care And Screening: Unhealthy Alcohol Use - Screening: Patient not identified as an unhealthy alcohol user when screened for unhealthy alcohol use using a systematic screening method
Quality 402: Tobacco Use And Help With Quitting Among Adolescents: Patient screened for tobacco and never smoked
Detail Level: Detailed

## 2023-03-28 NOTE — PROCEDURE: BIOPSY BY PUNCH METHOD
Consent: - Verbal and written consent was obtained and risks were reviewed prior to procedure today. \\n- Risks discussed include but are not limited to scarring, infection, bleeding, scabbing, incomplete removal, nerve damage, pain, pruritus, and allergy to anesthesia.
Yes
Biopsy Type: H and E
Home Suture Removal Text: - Patient or caregiver was provided with a sterile #11 blade and given verbal instructions for suture removal. \\n- If they have any questions, difficulties, or decide they would like the sutures removed in office,  then they will call [facility].
Anesthesia Volume In Cc (Will Not Render If 0): 0.5
Information: Selecting Yes will display possible errors in your note based on the variables you have selected. This validation is only offered as a suggestion for you. PLEASE NOTE THAT THE VALIDATION TEXT WILL BE REMOVED WHEN YOU FINALIZE YOUR NOTE. IF YOU WANT TO FAX A PRELIMINARY NOTE YOU WILL NEED TO TOGGLE THIS TO 'NO' IF YOU DO NOT WANT IT IN YOUR FAXED NOTE.
Validate That Anesthesia Is Not 0: No
Render In Bullet Format When Appropriate: Yes
Dressing: pressure dressing
Anesthesia Type: 2% lidocaine with epinephrine
Punch Size In Mm: 3
Size Of Lesion In Cm (Optional): 0
Post-Care Instructions: WOUND CARE:\\nDo NOT submerge wound in a bath, swimming pool, or hot tub for at least 1 week or for as long as there is an open wound. Gently cleanse the site daily with mild soap and water. Be careful NOT to allow a forceful stream of water to hit the biopsy site. After cleaning/showering, apply a thin layer of petrolatum ointment or Aquaphor in the wound followed by an adhesive bandage. Continue this process daily until healed. \\n\\nBLEEDING:\\nIf you develop persistent bleeding, apply firm and steady pressure over the dressing with gauze for 15 minutes. If bleeding persists, reapply pressure with an ice pack over the gauze for 15 minutes. NEVER APPLY ICE DIRECTLY TO THE SKIN. Do NOT peek under the gauze during these 15 minutes of pressure.  Call our office at [phone] if you cannot get the bleeding to stop. \\n\\nINFECTION:\\nSigns of infection may include increasing rather than decreasing pain (after the first few days), increasing redness/swelling/heat, draining pus, pink/red streaks around the wound, and fever or chills.  Please call our office immediately at [phone] if infection is suspected. It is normal to have some mild redness on or around the wound edges; this will lighten day by day and will become less tender.\\n\\nPAIN:\\nPain is usually minimal, but if needed you may take acetaminophen (Tylenol) every four hours as needed. Applying an ice pack over the dressing for 15-20 minutes every 2-3 hours will relieve swelling, lessen pain, and help minimize bruising. NEVER APPLY ICE DIRECTLY TO THE SKIN. Avoid ibuprofen (Advil, Motrin) and naproxen (Aleve) for the first 48 hours as these can increase bleeding.\\n\\nSWELLING AND BRUISING:\\nSwelling and bruising are common and temporary, usually lasting 1 - 2 weeks. It is more common in areas treated around the eyes, hands, and feet. In the days following the procedure, swelling and bruising can be minimized by keeping the affected areas elevated when possible, reducing salty foods, and applying ice packs over the dressing for 15-20 minutes every 2-3 hours. NEVER APPLY ICE DIRECTLY TO THE SKIN.\\n\\nITCHING:\\nItchiness on and around the wound is very common and can last several days to weeks after surgery. Mild itch is normal as the wound is healing. \\n\\nNERVE CHANGES:\\nNumbness is usually temporary, but it may last for several weeks to months. You may also experience sharp pains at the wound sight as it heals.  Mild pain is normal and will gradually improve with time.\\n \\nNO SMOKING:\\nSmoking will delay the healing process. If you smoke, we recommend trying to quit or at minimum reduce how much you smoke following a procedure.
Billing Type: Third-Party Bill
Epidermal Sutures: gel foam
Detail Level: Detailed
Wound Care: Petrolatum
Hemostasis: Pressure
Notification Instructions: - It can take up to 2 weeks to get a biopsy result. Please refrain from calling to request results until after 2 weeks.

## 2023-05-23 ENCOUNTER — APPOINTMENT (OUTPATIENT)
Dept: URBAN - METROPOLITAN AREA CLINIC 254 | Age: 58
Setting detail: DERMATOLOGY
End: 2023-05-27

## 2023-05-23 VITALS — WEIGHT: 162 LBS | RESPIRATION RATE: 14 BRPM | HEIGHT: 61 IN

## 2023-05-23 DIAGNOSIS — L81.4 OTHER MELANIN HYPERPIGMENTATION: ICD-10-CM

## 2023-05-23 DIAGNOSIS — L21.8 OTHER SEBORRHEIC DERMATITIS: ICD-10-CM

## 2023-05-23 DIAGNOSIS — D22 MELANOCYTIC NEVI: ICD-10-CM

## 2023-05-23 DIAGNOSIS — L82.1 OTHER SEBORRHEIC KERATOSIS: ICD-10-CM

## 2023-05-23 DIAGNOSIS — L82.0 INFLAMED SEBORRHEIC KERATOSIS: ICD-10-CM

## 2023-05-23 DIAGNOSIS — L40.4 GUTTATE PSORIASIS: ICD-10-CM

## 2023-05-23 DIAGNOSIS — D18.0 HEMANGIOMA: ICD-10-CM

## 2023-05-23 DIAGNOSIS — Z71.89 OTHER SPECIFIED COUNSELING: ICD-10-CM

## 2023-05-23 PROBLEM — D18.01 HEMANGIOMA OF SKIN AND SUBCUTANEOUS TISSUE: Status: ACTIVE | Noted: 2023-05-23

## 2023-05-23 PROBLEM — D22.5 MELANOCYTIC NEVI OF TRUNK: Status: ACTIVE | Noted: 2023-05-23

## 2023-05-23 PROCEDURE — 99213 OFFICE O/P EST LOW 20 MIN: CPT | Mod: 25

## 2023-05-23 PROCEDURE — OTHER MIPS QUALITY: OTHER

## 2023-05-23 PROCEDURE — 17110 DESTRUCT B9 LESION 1-14: CPT

## 2023-05-23 PROCEDURE — OTHER COUNSELING: OTHER

## 2023-05-23 PROCEDURE — OTHER LIQUID NITROGEN: OTHER

## 2023-05-23 PROCEDURE — OTHER ADDITIONAL NOTES: OTHER

## 2023-05-23 ASSESSMENT — LOCATION DETAILED DESCRIPTION DERM
LOCATION DETAILED: RIGHT MEDIAL BREAST 4-5:00 REGION
LOCATION DETAILED: LEFT MEDIAL BREAST 7-8:00 REGION
LOCATION DETAILED: LEFT INFERIOR LATERAL NECK
LOCATION DETAILED: LEFT MID-UPPER BACK
LOCATION DETAILED: LEFT MEDIAL BREAST 9-10:00 REGION
LOCATION DETAILED: LEFT CYMBA CONCHA
LOCATION DETAILED: INFERIOR THORACIC SPINE
LOCATION DETAILED: RIGHT INFERIOR ANTERIOR NECK
LOCATION DETAILED: RIGHT CRUS OF HELIX
LOCATION DETAILED: LEFT MEDIAL BREAST 6-7:00 REGION
LOCATION DETAILED: LEFT SUPERIOR UPPER BACK

## 2023-05-23 ASSESSMENT — LOCATION ZONE DERM
LOCATION ZONE: EAR
LOCATION ZONE: TRUNK
LOCATION ZONE: NECK

## 2023-05-23 ASSESSMENT — LOCATION SIMPLE DESCRIPTION DERM
LOCATION SIMPLE: LEFT UPPER BACK
LOCATION SIMPLE: UPPER BACK
LOCATION SIMPLE: RIGHT ANTERIOR NECK
LOCATION SIMPLE: LEFT ANTERIOR NECK
LOCATION SIMPLE: RIGHT BREAST
LOCATION SIMPLE: LEFT BREAST
LOCATION SIMPLE: RIGHT EAR
LOCATION SIMPLE: LEFT EAR

## 2023-05-23 NOTE — PROCEDURE: ADDITIONAL NOTES
Additional Notes: Pt controls with Cerave anti itch moisturizer
Render Risk Assessment In Note?: no
Detail Level: Simple

## 2023-05-23 NOTE — PROCEDURE: LIQUID NITROGEN
Render Note In Bullet Format When Appropriate: No
Medical Necessity Clause: This procedure was medically necessary because the lesions that were treated were:
Show Aperture Variable?: Yes
Medical Necessity Information: It is in your best interest to select a reason for this procedure from the list below. All of these items fulfill various CMS LCD requirements except the new and changing color options.
Detail Level: Detailed
Post-Care Instructions: I reviewed with the patient in detail post-care instructions. Patient is to wear sunprotection, and avoid picking at any of the treated lesions. Pt may apply Vaseline to crusted or scabbing areas.
Consent: The patient's consent was obtained including but not limited to risks of crusting, scabbing, blistering, scarring, darker or lighter pigmentary change, recurrence, incomplete removal and infection.
Spray Paint Text: The liquid nitrogen was applied to the skin utilizing a spray paint frosting technique.

## 2024-05-28 ENCOUNTER — APPOINTMENT (OUTPATIENT)
Dept: URBAN - METROPOLITAN AREA CLINIC 254 | Age: 59
Setting detail: DERMATOLOGY
End: 2024-05-28

## 2024-05-28 VITALS — WEIGHT: 167 LBS | HEIGHT: 61 IN

## 2024-05-28 DIAGNOSIS — Z85.828 PERSONAL HISTORY OF OTHER MALIGNANT NEOPLASM OF SKIN: ICD-10-CM

## 2024-05-28 DIAGNOSIS — L57.0 ACTINIC KERATOSIS: ICD-10-CM

## 2024-05-28 DIAGNOSIS — D18.0 HEMANGIOMA: ICD-10-CM

## 2024-05-28 DIAGNOSIS — D22 MELANOCYTIC NEVI: ICD-10-CM

## 2024-05-28 DIAGNOSIS — L82.1 OTHER SEBORRHEIC KERATOSIS: ICD-10-CM

## 2024-05-28 DIAGNOSIS — L57.8 OTHER SKIN CHANGES DUE TO CHRONIC EXPOSURE TO NONIONIZING RADIATION: ICD-10-CM

## 2024-05-28 DIAGNOSIS — L65.9 NONSCARRING HAIR LOSS, UNSPECIFIED: ICD-10-CM

## 2024-05-28 DIAGNOSIS — L40.4 GUTTATE PSORIASIS: ICD-10-CM

## 2024-05-28 DIAGNOSIS — L81.4 OTHER MELANIN HYPERPIGMENTATION: ICD-10-CM

## 2024-05-28 PROBLEM — D18.01 HEMANGIOMA OF SKIN AND SUBCUTANEOUS TISSUE: Status: ACTIVE | Noted: 2024-05-28

## 2024-05-28 PROBLEM — D22.5 MELANOCYTIC NEVI OF TRUNK: Status: ACTIVE | Noted: 2024-05-28

## 2024-05-28 PROBLEM — D22.9 MELANOCYTIC NEVI, UNSPECIFIED: Status: ACTIVE | Noted: 2024-05-28

## 2024-05-28 PROCEDURE — OTHER LIQUID NITROGEN: OTHER

## 2024-05-28 PROCEDURE — OTHER PHOTO-DOCUMENTATION: OTHER

## 2024-05-28 PROCEDURE — 99213 OFFICE O/P EST LOW 20 MIN: CPT | Mod: 25

## 2024-05-28 PROCEDURE — OTHER SUNSCREEN RECOMMENDATIONS: OTHER

## 2024-05-28 PROCEDURE — OTHER ADDITIONAL NOTES: OTHER

## 2024-05-28 PROCEDURE — 17000 DESTRUCT PREMALG LESION: CPT

## 2024-05-28 PROCEDURE — OTHER MONITORING: OTHER

## 2024-05-28 PROCEDURE — OTHER MIPS QUALITY: OTHER

## 2024-05-28 PROCEDURE — 17003 DESTRUCT PREMALG LES 2-14: CPT

## 2024-05-28 PROCEDURE — OTHER COUNSELING: OTHER

## 2024-05-28 ASSESSMENT — LOCATION DETAILED DESCRIPTION DERM
LOCATION DETAILED: RIGHT LATERAL SUPERIOR EYELID
LOCATION DETAILED: LEFT INFERIOR CENTRAL MALAR CHEEK
LOCATION DETAILED: SUPERIOR LUMBAR SPINE
LOCATION DETAILED: RIGHT LATERAL FOREHEAD
LOCATION DETAILED: LEFT PROXIMAL PRETIBIAL REGION
LOCATION DETAILED: LEFT LATERAL SUPERIOR EYELID
LOCATION DETAILED: SUPERIOR THORACIC SPINE
LOCATION DETAILED: INFERIOR THORACIC SPINE
LOCATION DETAILED: MID TRAPEZIAL NECK
LOCATION DETAILED: UPPER STERNUM

## 2024-05-28 ASSESSMENT — LOCATION SIMPLE DESCRIPTION DERM
LOCATION SIMPLE: RIGHT SUPERIOR EYELID
LOCATION SIMPLE: TRAPEZIAL NECK
LOCATION SIMPLE: LEFT CHEEK
LOCATION SIMPLE: CHEST
LOCATION SIMPLE: LEFT SUPERIOR EYELID
LOCATION SIMPLE: LOWER BACK
LOCATION SIMPLE: LEFT PRETIBIAL REGION
LOCATION SIMPLE: RIGHT FOREHEAD
LOCATION SIMPLE: UPPER BACK

## 2024-05-28 ASSESSMENT — LOCATION ZONE DERM
LOCATION ZONE: TRUNK
LOCATION ZONE: LEG
LOCATION ZONE: EYELID
LOCATION ZONE: FACE
LOCATION ZONE: NECK

## 2024-05-28 NOTE — HPI: FULL BODY SKIN EXAMINATION
How Severe Are Your Spot(S)?: mild
What Type Of Note Output Would You Prefer (Optional)?: Bullet Format
What Is The Reason For Today's Visit?: Full Body Skin Examination
What Is The Reason For Today's Visit? (Being Monitored For X): concerning skin lesions on an annual basis
Additional History: Patient reports no concerns at this time.

## 2024-05-28 NOTE — PROCEDURE: ADDITIONAL NOTES
Render Risk Assessment In Note?: no
Detail Level: Simple
Additional Notes: Reassured pt condition is a condition that appears with aging. Encouraged to consider Naima as an effective treatment option. Patient expressed understanding.
Additional Notes: Encouraged patient to monitor with time-discussed biopsy by shave removal if changes occur. Patient expressed understanding.

## 2024-05-28 NOTE — PROCEDURE: LIQUID NITROGEN
Render Note In Bullet Format When Appropriate: Yes
Show Aperture Variable?: No
Post-Care Instructions: - Patient was instructed to avoid picking at any of the treated lesions.
Detail Level: Detailed
Consent: - Discussed that these are a result of cumulative sun exposure.\\n- Consent was obtained and risks were reviewed prior to procedure today. All questions were answered prior to procedure today.\\n- Risks discussed include but are not limited to pain, crusting, scabbing, blistering, scarring, temporary or permanent darker or lighter pigmentary change, recurrence, incomplete resolution, and infection.
Duration Of Freeze Thaw-Cycle (Seconds): 0
Application Tool (Optional): Liquid Nitrogen Sprayer

## 2024-08-27 ENCOUNTER — APPOINTMENT (OUTPATIENT)
Dept: URBAN - METROPOLITAN AREA CLINIC 254 | Age: 59
Setting detail: DERMATOLOGY
End: 2024-08-27

## 2024-08-27 VITALS — HEIGHT: 62 IN | WEIGHT: 167 LBS

## 2024-08-27 DIAGNOSIS — D49.2 NEOPLASM OF UNSPECIFIED BEHAVIOR OF BONE, SOFT TISSUE, AND SKIN: ICD-10-CM

## 2024-08-27 DIAGNOSIS — D22 MELANOCYTIC NEVI: ICD-10-CM

## 2024-08-27 DIAGNOSIS — L81.0 POSTINFLAMMATORY HYPERPIGMENTATION: ICD-10-CM

## 2024-08-27 PROBLEM — D22.71 MELANOCYTIC NEVI OF RIGHT LOWER LIMB, INCLUDING HIP: Status: ACTIVE | Noted: 2024-08-27

## 2024-08-27 PROCEDURE — 99213 OFFICE O/P EST LOW 20 MIN: CPT | Mod: 25

## 2024-08-27 PROCEDURE — 11102 TANGNTL BX SKIN SINGLE LES: CPT

## 2024-08-27 PROCEDURE — OTHER BIOPSY BY SHAVE METHOD: OTHER

## 2024-08-27 PROCEDURE — OTHER MIPS QUALITY: OTHER

## 2024-08-27 PROCEDURE — OTHER COUNSELING: OTHER

## 2024-08-27 PROCEDURE — OTHER MONITORING: OTHER

## 2024-08-27 ASSESSMENT — LOCATION SIMPLE DESCRIPTION DERM
LOCATION SIMPLE: RIGHT THIGH
LOCATION SIMPLE: RIGHT PRETIBIAL REGION
LOCATION SIMPLE: LEFT CHEEK

## 2024-08-27 ASSESSMENT — LOCATION DETAILED DESCRIPTION DERM
LOCATION DETAILED: RIGHT PROXIMAL PRETIBIAL REGION
LOCATION DETAILED: LEFT CENTRAL MALAR CHEEK
LOCATION DETAILED: RIGHT ANTERIOR PROXIMAL THIGH

## 2024-08-27 ASSESSMENT — LOCATION ZONE DERM
LOCATION ZONE: FACE
LOCATION ZONE: LEG

## 2024-08-27 NOTE — PROCEDURE: COUNSELING
Patient Specific Counseling (Will Not Stick From Patient To Patient): - Advised that the actinic keratosis/es previously treated appears resolved today.\\n- However, recurrence is possible and more may develop on other areas in the future.\\n- Any remaining postinflammatory pigmentation is likely to fade with time.\\n- Return to clinic should any new rough areas develop or any previously treated areas seem to recur.\\n- Diligent use of sun protection/sun screen is recommended.
Detail Level: Simple
Detail Level: Detailed
Patient Specific Counseling (Will Not Stick From Patient To Patient): - Discussed that this nevus has atypical features that warrant a biopsy.\\n- Patient expressed understanding.\\n- Follow-up for re-examination for regimentation or an additional removal procedure may become necessary depending the pathologist's report.

## 2024-08-27 NOTE — PROCEDURE: MIPS QUALITY
Quality 137: Melanoma: Continuity Of Care - Recall System: Recall system not utilized, reason not otherwise specified
Quality 130: Documentation Of Current Medications In The Medical Record: Current Medications Documented
Quality 47: Advance Care Plan: Advance care planning not documented, reason not otherwise specified.
Detail Level: Detailed
Quality 226: Preventive Care And Screening: Tobacco Use: Screening And Cessation Intervention: Patient screened for tobacco use and is an ex/non-smoker

## 2024-08-27 NOTE — PROCEDURE: BIOPSY BY SHAVE METHOD
Anesthesia Volume In Cc: 0.3
Bill For Surgical Tray: no
Type Of Destruction Used: Curettage
Was A Bandage Applied: Yes
Hemostasis: Drysol
Detail Level: Detailed
Wound Care: Aquaphor
Depth Of Biopsy: dermis
Curettage Text: The wound bed was treated with curettage after the biopsy was performed.
Lab: [id number]
Post-Care Instructions: WOUND CARE:\\nDo NOT submerge wound in a bath, swimming pool, or hot tub for at least 1 week or for as long as there is an open wound. Gently cleanse the site daily with mild soap and water. Be careful NOT to allow a forceful stream of water to hit the biopsy site. After cleaning/showering, apply a thin layer of petrolatum ointment or Aquaphor in the wound followed by an adhesive bandage. Continue this process daily until healed. \\n\\nBLEEDING:\\nIf you develop persistent bleeding, apply firm and steady pressure over the dressing with gauze for 15 minutes. If bleeding persists, reapply pressure with an ice pack over the gauze for 15 minutes. NEVER APPLY ICE DIRECTLY TO THE SKIN. Do NOT peek under the gauze during these 15 minutes of pressure.  Call our office at [phone] if you cannot get the bleeding to stop. \\n\\nINFECTION:\\nSigns of infection may include increasing rather than decreasing pain (after the first few days), increasing redness/swelling/heat, draining pus, pink/red streaks around the wound, and fever or chills.  Please call our office immediately at [phone] if infection is suspected. It is normal to have some mild redness on or around the wound edges; this will lighten day by day and will become less tender.\\n\\nPAIN:\\nPain is usually minimal, but if needed you may take acetaminophen (Tylenol) every four hours as needed. Applying an ice pack over the dressing for 15-20 minutes every 2-3 hours will relieve swelling, lessen pain, and help minimize bruising. NEVER APPLY ICE DIRECTLY TO THE SKIN. Avoid ibuprofen (Advil, Motrin) and naproxen (Aleve) for the first 48 hours as these can increase bleeding.\\n\\nSWELLING AND BRUISING:\\nSwelling and bruising are common and temporary, usually lasting 1 - 2 weeks. It is more common in areas treated around the eyes, hands, and feet. In the days following the procedure, swelling and bruising can be minimized by keeping the affected areas elevated when possible, reducing salty foods, and applying ice packs over the dressing for 15-20 minutes every 2-3 hours. NEVER APPLY ICE DIRECTLY TO THE SKIN.\\n\\nITCHING:\\nItchiness on and around the wound is very common and can last several days to weeks after surgery. Mild itch is normal as the wound is healing. \\n\\nNERVE CHANGES:\\nNumbness is usually temporary, but it may last for several weeks to months. You may also experience sharp pains at the wound sight as it heals.  Mild pain is normal and will gradually improve with time.\\n \\nNO SMOKING:\\nSmoking will delay the healing process. If you smoke, we recommend trying to quit or at minimum reduce how much you smoke following a procedure.
Dressing: bandage
Notification Instructions: - It can take up to 2 -3 weeks to get a biopsy result. \\n- Please refrain from calling to request results until after 2 weeks.
Cryotherapy Text: The wound bed was treated with cryotherapy after the biopsy was performed.
Biopsy Type: H and E
Electrodesiccation Text: The wound bed was treated with electrodesiccation after the biopsy was performed.
Size Of Lesion In Cm: 0
Anesthesia Type: 1% lidocaine with epinephrine
Billing Type: Third-Party Bill
Information: Selecting Yes will display possible errors in your note based on the variables you have selected. This validation is only offered as a suggestion for you. PLEASE NOTE THAT THE VALIDATION TEXT WILL BE REMOVED WHEN YOU FINALIZE YOUR NOTE. IF YOU WANT TO FAX A PRELIMINARY NOTE YOU WILL NEED TO TOGGLE THIS TO 'NO' IF YOU DO NOT WANT IT IN YOUR FAXED NOTE.
Electrodesiccation And Curettage Text: The wound bed was treated with electrodesiccation and curettage after the biopsy was performed.
Biopsy Method: Dermablade
Consent: - Consent was obtained and risks were reviewed prior to procedure today. All questions were answered prior to procedure today.\\n- Risks discussed include but are not limited to scarring, infection, bleeding, scabbing, incomplete removal, nerve damage, pain, and allergy to anesthesia.
Silver Nitrate Text: The wound bed was treated with silver nitrate after the biopsy was performed.

## 2024-10-24 ENCOUNTER — APPOINTMENT (OUTPATIENT)
Dept: URBAN - METROPOLITAN AREA CLINIC 254 | Age: 59
Setting detail: DERMATOLOGY
End: 2024-10-25

## 2024-10-24 VITALS — WEIGHT: 165 LBS | HEIGHT: 61 IN

## 2024-10-24 DIAGNOSIS — D485 NEOPLASM OF UNCERTAIN BEHAVIOR OF SKIN: ICD-10-CM

## 2024-10-24 PROBLEM — D48.5 NEOPLASM OF UNCERTAIN BEHAVIOR OF SKIN: Status: ACTIVE | Noted: 2024-10-24

## 2024-10-24 PROCEDURE — OTHER EXCISION: OTHER

## 2024-10-24 PROCEDURE — OTHER COUNSELING: OTHER

## 2024-10-24 PROCEDURE — 12032 INTMD RPR S/A/T/EXT 2.6-7.5: CPT

## 2024-10-24 PROCEDURE — 11602 EXC TR-EXT MAL+MARG 1.1-2 CM: CPT

## 2024-10-24 PROCEDURE — OTHER MIPS QUALITY: OTHER

## 2024-10-24 ASSESSMENT — LOCATION SIMPLE DESCRIPTION DERM: LOCATION SIMPLE: RIGHT PRETIBIAL REGION

## 2024-10-24 ASSESSMENT — LOCATION DETAILED DESCRIPTION DERM: LOCATION DETAILED: RIGHT PROXIMAL PRETIBIAL REGION

## 2024-10-24 ASSESSMENT — LOCATION ZONE DERM: LOCATION ZONE: LEG

## 2024-10-24 NOTE — PROCEDURE: EXCISION
Excision (Pending Path): This plan allows you to document an excision of a neoplasm of uncertain behavior.  At the time of the excision, select whether you think the lesion is benign or malignant, but do NOT finalize your note.  Once the pathology results are available, you can toggle to the correct designation (benign or malignant) and the excision plan will bill accordingly.
Bill Excision As Benign Or Malignant?: Malignant
Medical Necessity Information: It is in your best interest to select a reason for this procedure from the list below. All of these items fulfill various CMS LCD requirements except lesion extends to a margin.
Include Z78.9 (Other Specified Conditions Influencing Health Status) As An Associated Diagnosis?: No
Medical Necessity Clause: This procedure was medically necessary because the lesion that was treated was:
Lab: [id number]
Lab Facility: [id number]
Accession #: [id number]
Surgeon (Optional): Anel Franklin PA-C
Size Of Lesion In Cm: 0.8
X Size Of Lesion In Cm (Optional): 0.7
Size Of Margin In Cm: 0.3
Anesthesia Volume In Cc: 9
Eye Clamp Note Details: An eye clamp was used during the procedure.
Excision Method: Elliptical
Saucerization Depth: dermis and superficial adipose tissue
Repair Type: Intermediate
Intermediate / Complex Repair - Final Wound Length In Cm: 4.1
Suturegard Retention Suture: 2-0 Nylon
Retention Suture Bite Size: 3 mm
Length To Time In Minutes Device Was In Place: 10
Number Of Hemigard Strips Per Side: 1
Undermining Type: Entire Wound
Debridement Text: The wound edges were debrided prior to proceeding with the closure to facilitate wound healing.
Helical Rim Text: The closure involved the helical rim.
Vermilion Border Text: The closure involved the vermilion border.
Nostril Rim Text: The closure involved the nostril rim.
Retention Suture Text: Retention sutures were placed to support the closure and prevent dehiscence.
Suture Removal: 14 days
Epidermal Closure Graft Donor Site (Optional): simple interrupted
Graft Donor Site Bandage (Optional-Leave Blank If You Don't Want In Note): Steri-strips and a pressure bandage were applied to the donor site.
Detail Level: Detailed
Excision Depth: adipose tissue
Scalpel Size: 15 blade
Anesthesia Type: 1% lidocaine with epinephrine and a 1:10 solution of 8.4% sodium bicarbonate
Hemostasis: Electrocautery
Estimated Blood Loss (Cc): minimal
Repair Depth: use same depth as excision depth
Deep Sutures: 4-0 PGCL
Epidermal Sutures: 4-0 Nylon
Wound Care: No ointment
Dressing: steri-strips and pressure dressing
Suturegard Intro: Intraoperative tissue expansion was performed, utilizing the SUTUREGARD device, in order to reduce wound tension.
Suturegard Body: The suture ends were repeatedly re-tightened and re-clamped to achieve the desired tissue expansion.
Hemigard Intro: Due to skin fragility and wound tension, it was decided to use HEMIGARD adhesive retention suture devices to permit a linear closure. The skin was cleaned and dried for a 6cm distance away from the wound. Excessive hair, if present, was removed to allow for adhesion.
Hemigard Postcare Instructions: The HEMIGARD strips are to remain completely dry for at least 5-7 days.
Positioning (Leave Blank If You Do Not Want): The patient was placed in a comfortable position exposing the surgical site.
Complex Repair Preamble Text (Leave Blank If You Do Not Want): Extensive wide undermining was performed.
Intermediate Repair Preamble Text (Leave Blank If You Do Not Want): Undermining was performed with blunt dissection.
Fusiform Excision Additional Text (Leave Blank If You Do Not Want): The margin was drawn around the clinically apparent lesion.  A fusiform shape was then drawn on the skin incorporating the lesion and margins.  Incisions were then made along these lines to the appropriate tissue plane and the lesion was extirpated.
Eliptical Excision Additional Text (Leave Blank If You Do Not Want): The margin was drawn around the clinically apparent lesion.  An elliptical shape was then drawn on the skin incorporating the lesion and margins.  Incisions were then made along these lines to the appropriate tissue plane and the lesion was extirpated.
Saucerization Excision Additional Text (Leave Blank If You Do Not Want): The margin was drawn around the clinically apparent lesion.  Incisions were then made along these lines, in a tangential fashion, to the appropriate tissue plane and the lesion was extirpated.
Slit Excision Additional Text (Leave Blank If You Do Not Want): A linear line was drawn on the skin overlying the lesion. An incision was made slowly until the lesion was visualized.  Once visualized, the lesion was removed with blunt dissection.
Excisional Biopsy Additional Text (Leave Blank If You Do Not Want): The margin was drawn around the clinically apparent lesion. An elliptical shape was then drawn on the skin incorporating the lesion and margins.  Incisions were then made along these lines to the appropriate tissue plane and the lesion was extirpated.
Perilesional Excision Additional Text (Leave Blank If You Do Not Want): The margin was drawn around the clinically apparent lesion. Incisions were then made along these lines to the appropriate tissue plane and the lesion was extirpated.
Repair Performed By Another Provider Text (Leave Blank If You Do Not Want): After the tissue was excised the defect was repaired by another provider.
No Repair - Repaired With Adjacent Surgical Defect Text (Leave Blank If You Do Not Want): After the excision the defect was repaired concurrently with another surgical defect which was in close approximation.
Adjacent Tissue Transfer Text: The defect edges were debeveled with a #15 scalpel blade. Given the location of the defect and the proximity to free margins an adjacent tissue transfer was deemed most appropriate. Using a sterile surgical marker, an appropriate flap was drawn incorporating the defect and placing the expected incisions within the relaxed skin tension lines where possible. The area thus outlined was incised deep to adipose tissue with a #15 scalpel blade. The skin margins were undermined to an appropriate distance in all directions utilizing iris scissors and carried over to close the primary defect.
Advancement Flap (Single) Text: The defect edges were debeveled with a #15 scalpel blade. Given the location of the defect and the proximity to free margins a single advancement flap was deemed most appropriate. Using a sterile surgical marker, an appropriate advancement flap was drawn incorporating the defect and placing the expected incisions within the relaxed skin tension lines where possible. The area thus outlined was incised deep to adipose tissue with a #15 scalpel blade. The skin margins were undermined to an appropriate distance in all directions utilizing iris scissors. Following this, the designed flap was advanced and carried over into the primary defect and sutured into place.
Advancement Flap (Double) Text: The defect edges were debeveled with a #15 scalpel blade. Given the location of the defect and the proximity to free margins a double advancement flap was deemed most appropriate. Using a sterile surgical marker, the appropriate advancement flaps were drawn incorporating the defect and placing the expected incisions within the relaxed skin tension lines where possible. The area thus outlined was incised deep to adipose tissue with a #15 scalpel blade. The skin margins were undermined to an appropriate distance in all directions utilizing iris scissors. Following this, the designed flaps were advanced and carried over into the primary defect and sutured into place.
Burow's Advancement Flap Text: The defect edges were debeveled with a #15 scalpel blade. Given the location of the defect and the proximity to free margins a Burow's advancement flap was deemed most appropriate. Using a sterile surgical marker, the appropriate advancement flap was drawn incorporating the defect and placing the expected incisions within the relaxed skin tension lines where possible. The area thus outlined was incised deep to adipose tissue with a #15 scalpel blade. The skin margins were undermined to an appropriate distance in all directions utilizing iris scissors. Following this, the designed flap was advanced and carried over into the primary defect and sutured into place.
Chonodrocutaneous Helical Advancement Flap Text: The defect edges were debeveled with a #15 scalpel blade. Given the location of the defect and the proximity to free margins a chondrocutaneous helical advancement flap was deemed most appropriate. Using a sterile surgical marker, the appropriate advancement flap was drawn incorporating the defect and placing the expected incisions within the relaxed skin tension lines where possible. The area thus outlined was incised deep to adipose tissue with a #15 scalpel blade. The skin margins were undermined to an appropriate distance in all directions utilizing iris scissors. Following this, the designed flap was advanced and carried over into the primary defect and sutured into place.
Crescentic Advancement Flap Text: The defect edges were debeveled with a #15 scalpel blade. Given the location of the defect and the proximity to free margins a crescentic advancement flap was deemed most appropriate. Using a sterile surgical marker, the appropriate advancement flap was drawn incorporating the defect and placing the expected incisions within the relaxed skin tension lines where possible. The area thus outlined was incised deep to adipose tissue with a #15 scalpel blade. The skin margins were undermined to an appropriate distance in all directions utilizing iris scissors. Following this, the designed flap was advanced and carried over into the primary defect and sutured into place.
A-T Advancement Flap Text: The defect edges were debeveled with a #15 scalpel blade. Given the location of the defect, shape of the defect and the proximity to free margins an A-T advancement flap was deemed most appropriate. Using a sterile surgical marker, an appropriate advancement flap was drawn incorporating the defect and placing the expected incisions within the relaxed skin tension lines where possible. The area thus outlined was incised deep to adipose tissue with a #15 scalpel blade. The skin margins were undermined to an appropriate distance in all directions utilizing iris scissors. Following this, the designed flap was advanced and carried over into the primary defect and sutured into place.
O-T Advancement Flap Text: The defect edges were debeveled with a #15 scalpel blade. Given the location of the defect, shape of the defect and the proximity to free margins an O-T advancement flap was deemed most appropriate. Using a sterile surgical marker, an appropriate advancement flap was drawn incorporating the defect and placing the expected incisions within the relaxed skin tension lines where possible. The area thus outlined was incised deep to adipose tissue with a #15 scalpel blade. The skin margins were undermined to an appropriate distance in all directions utilizing iris scissors. Following this, the designed flap was advanced and carried over into the primary defect and sutured into place.
O-L Flap Text: The defect edges were debeveled with a #15 scalpel blade. Given the location of the defect, shape of the defect and the proximity to free margins an O-L flap was deemed most appropriate. Using a sterile surgical marker, an appropriate advancement flap was drawn incorporating the defect and placing the expected incisions within the relaxed skin tension lines where possible. The area thus outlined was incised deep to adipose tissue with a #15 scalpel blade. The skin margins were undermined to an appropriate distance in all directions utilizing iris scissors. Following this, the designed flap was advanced and carried over into the primary defect and sutured into place.
O-Z Flap Text: The defect edges were debeveled with a #15 scalpel blade. Given the location of the defect, shape of the defect and the proximity to free margins an O-Z flap was deemed most appropriate. Using a sterile surgical marker, an appropriate transposition flap was drawn incorporating the defect and placing the expected incisions within the relaxed skin tension lines where possible. The area thus outlined was incised deep to adipose tissue with a #15 scalpel blade. The skin margins were undermined to an appropriate distance in all directions utilizing iris scissors. Following this, the designed flap was carried over into the primary defect and sutured into place.
Double O-Z Flap Text: The defect edges were debeveled with a #15 scalpel blade. Given the location of the defect, shape of the defect and the proximity to free margins a Double O-Z flap was deemed most appropriate. Using a sterile surgical marker, an appropriate transposition flap was drawn incorporating the defect and placing the expected incisions within the relaxed skin tension lines where possible. The area thus outlined was incised deep to adipose tissue with a #15 scalpel blade. The skin margins were undermined to an appropriate distance in all directions utilizing iris scissors. Following this, the designed flap was carried over into the primary defect and sutured into place.
V-Y Flap Text: The defect edges were debeveled with a #15 scalpel blade. Given the location of the defect, shape of the defect and the proximity to free margins a V-Y flap was deemed most appropriate. Using a sterile surgical marker, an appropriate advancement flap was drawn incorporating the defect and placing the expected incisions within the relaxed skin tension lines where possible. The area thus outlined was incised deep to adipose tissue with a #15 scalpel blade. The skin margins were undermined to an appropriate distance in all directions utilizing iris scissors. Following this, the designed flap was advanced and carried over into the primary defect and sutured into place.
Advancement-Rotation Flap Text: The defect edges were debeveled with a #15 scalpel blade. Given the location of the defect, shape of the defect and the proximity to free margins an advancement-rotation flap was deemed most appropriate. Using a sterile surgical marker, an appropriate flap was drawn incorporating the defect and placing the expected incisions within the relaxed skin tension lines where possible. The area thus outlined was incised deep to adipose tissue with a #15 scalpel blade. The skin margins were undermined to an appropriate distance in all directions utilizing iris scissors. Following this, the designed flap was carried over into the primary defect and sutured into place.
Mercedes Flap Text: The defect edges were debeveled with a #15 scalpel blade. Given the location of the defect, shape of the defect and the proximity to free margins a Mercedes flap was deemed most appropriate. Using a sterile surgical marker, an appropriate advancement flap was drawn incorporating the defect and placing the expected incisions within the relaxed skin tension lines where possible. The area thus outlined was incised deep to adipose tissue with a #15 scalpel blade. The skin margins were undermined to an appropriate distance in all directions utilizing iris scissors. Following this, the designed flap was advanced and carried over into the primary defect and sutured into place.
Modified Advancement Flap Text: The defect edges were debeveled with a #15 scalpel blade. Given the location of the defect, shape of the defect and the proximity to free margins a modified advancement flap was deemed most appropriate. Using a sterile surgical marker, an appropriate advancement flap was drawn incorporating the defect and placing the expected incisions within the relaxed skin tension lines where possible. The area thus outlined was incised deep to adipose tissue with a #15 scalpel blade. The skin margins were undermined to an appropriate distance in all directions utilizing iris scissors. Following this, the designed flap was advanced and carried over into the primary defect and sutured into place.
Mucosal Advancement Flap Text: Given the location of the defect, shape of the defect and the proximity to free margins a mucosal advancement flap was deemed most appropriate. Incisions were made with a 15 blade scalpel in the appropriate fashion along the cutaneous vermilion border and the mucosal lip. The remaining actinically damaged mucosal tissue was excised.  The mucosal advancement flap was then elevated to the gingival sulcus with care taken to preserve the neurovascular structures and advanced into the primary defect. Care was taken to ensure that precise realignment of the vermilion border was achieved.
Peng Advancement Flap Text: The defect edges were debeveled with a #15 scalpel blade. Given the location of the defect, shape of the defect and the proximity to free margins a Peng advancement flap was deemed most appropriate. Using a sterile surgical marker, an appropriate advancement flap was drawn incorporating the defect and placing the expected incisions within the relaxed skin tension lines where possible. The area thus outlined was incised deep to adipose tissue with a #15 scalpel blade. The skin margins were undermined to an appropriate distance in all directions utilizing iris scissors. Following this, the designed flap was advanced and carried over into the primary defect and sutured into place.
Hatchet Flap Text: The defect edges were debeveled with a #15 scalpel blade. Given the location of the defect, shape of the defect and the proximity to free margins a hatchet flap was deemed most appropriate. Using a sterile surgical marker, an appropriate hatchet flap was drawn incorporating the defect and placing the expected incisions within the relaxed skin tension lines where possible. The area thus outlined was incised deep to adipose tissue with a #15 scalpel blade. The skin margins were undermined to an appropriate distance in all directions utilizing iris scissors. Following this, the designed flap was carried over into the primary defect and sutured into place.
Rotation Flap Text: The defect edges were debeveled with a #15 scalpel blade. Given the location of the defect, shape of the defect and the proximity to free margins a rotation flap was deemed most appropriate. Using a sterile surgical marker, an appropriate rotation flap was drawn incorporating the defect and placing the expected incisions within the relaxed skin tension lines where possible. The area thus outlined was incised deep to adipose tissue with a #15 scalpel blade. The skin margins were undermined to an appropriate distance in all directions utilizing iris scissors. Following this, the designed flap was carried over into the primary defect and sutured into place.
Bilateral Rotation Flap Text: The defect edges were debeveled with a #15 scalpel blade. Given the location of the defect, shape of the defect and the proximity to free margins a bilateral rotation flap was deemed most appropriate. Using a sterile surgical marker, an appropriate rotation flap was drawn incorporating the defect and placing the expected incisions within the relaxed skin tension lines where possible. The area thus outlined was incised deep to adipose tissue with a #15 scalpel blade. The skin margins were undermined to an appropriate distance in all directions utilizing iris scissors. Following this, the designed flap was carried over into the primary defect and sutured into place.
Spiral Flap Text: The defect edges were debeveled with a #15 scalpel blade. Given the location of the defect, shape of the defect and the proximity to free margins a spiral flap was deemed most appropriate. Using a sterile surgical marker, an appropriate rotation flap was drawn incorporating the defect and placing the expected incisions within the relaxed skin tension lines where possible. The area thus outlined was incised deep to adipose tissue with a #15 scalpel blade. The skin margins were undermined to an appropriate distance in all directions utilizing iris scissors. Following this, the designed flap was carried over into the primary defect and sutured into place.
Staged Advancement Flap Text: The defect edges were debeveled with a #15 scalpel blade. Given the location of the defect, shape of the defect and the proximity to free margins a staged advancement flap was deemed most appropriate. Using a sterile surgical marker, an appropriate advancement flap was drawn incorporating the defect and placing the expected incisions within the relaxed skin tension lines where possible. The area thus outlined was incised deep to adipose tissue with a #15 scalpel blade. The skin margins were undermined to an appropriate distance in all directions utilizing iris scissors. Following this, the designed flap was carried over into the primary defect and sutured into place.
Star Wedge Flap Text: The defect edges were debeveled with a #15 scalpel blade. Given the location of the defect, shape of the defect and the proximity to free margins a star wedge flap was deemed most appropriate. Using a sterile surgical marker, an appropriate rotation flap was drawn incorporating the defect and placing the expected incisions within the relaxed skin tension lines where possible. The area thus outlined was incised deep to adipose tissue with a #15 scalpel blade. The skin margins were undermined to an appropriate distance in all directions utilizing iris scissors. Following this, the designed flap was carried over into the primary defect and sutured into place.
Transposition Flap Text: The defect edges were debeveled with a #15 scalpel blade. Given the location of the defect and the proximity to free margins a transposition flap was deemed most appropriate. Using a sterile surgical marker, an appropriate transposition flap was drawn incorporating the defect. The area thus outlined was incised deep to adipose tissue with a #15 scalpel blade. The skin margins were undermined to an appropriate distance in all directions utilizing iris scissors. Following this, the designed flap was carried over into the primary defect and sutured into place.
Muscle Hinge Flap Text: The defect edges were debeveled with a #15 scalpel blade.  Given the size, depth and location of the defect and the proximity to free margins a muscle hinge flap was deemed most appropriate. Using a sterile surgical marker, an appropriate hinge flap was drawn incorporating the defect. The area thus outlined was incised with a #15 scalpel blade. The skin margins were undermined to an appropriate distance in all directions utilizing iris scissors. Following this, the designed flap was carried into the primary defect and sutured into place.
Mustarde Flap Text: The defect edges were debeveled with a #15 scalpel blade.  Given the size, depth and location of the defect and the proximity to free margins a Mustarde flap was deemed most appropriate. Using a sterile surgical marker, an appropriate flap was drawn incorporating the defect. The area thus outlined was incised with a #15 scalpel blade. The skin margins were undermined to an appropriate distance in all directions utilizing iris scissors. Following this, the designed flap was carried into the primary defect and sutured into place.
Nasal Turnover Hinge Flap Text: The defect edges were debeveled with a #15 scalpel blade.  Given the size, depth, location of the defect and the defect being full thickness a nasal turnover hinge flap was deemed most appropriate. Using a sterile surgical marker, an appropriate hinge flap was drawn incorporating the defect. The area thus outlined was incised with a #15 scalpel blade. The flap was designed to recreate the nasal mucosal lining and the alar rim. The skin margins were undermined to an appropriate distance in all directions utilizing iris scissors. Following this, the designed flap was carried over into the primary defect and sutured into place
Nasalis-Muscle-Based Myocutaneous Island Pedicle Flap Text: Using a #15 blade, an incision was made around the donor flap to the level of the nasalis muscle. Wide lateral undermining was then performed in both the subcutaneous plane above the nasalis muscle, and in a submuscular plane just above periosteum. This allowed the formation of a free nasalis muscle axial pedicle (based on the angular artery) which was still attached to the actual cutaneous flap, increasing its mobility and vascular viability. Hemostasis was obtained with pinpoint electrocoagulation. The flap was mobilized into position and the pivotal anchor points positioned and stabilized with buried interrupted sutures. Subcutaneous and dermal tissues were closed in a multilayered fashion with sutures. Tissue redundancies were excised, and the epidermal edges were apposed without significant tension and sutured with sutures.
Nasalis Myocutaneous Flap Text: Using a #15 blade, an incision was made around the donor flap to the level of the nasalis muscle. Wide lateral undermining was then performed in both the subcutaneous plane above the nasalis muscle, and in a submuscular plane just above periosteum. This allowed the formation of a free nasalis muscle axial pedicle which was still attached to the actual cutaneous flap, increasing its mobility and vascular viability. Hemostasis was obtained with pinpoint electrocoagulation. The flap was mobilized into position and the pivotal anchor points positioned and stabilized with buried interrupted sutures. Subcutaneous and dermal tissues were closed in a multilayered fashion with sutures. Tissue redundancies were excised, and the epidermal edges were apposed without significant tension and sutured with sutures.
Nasolabial Transposition Flap Text: The defect edges were debeveled with a #15 scalpel blade.  Given the size, depth and location of the defect and the proximity to free margins a nasolabial transposition flap was deemed most appropriate. Using a sterile surgical marker, an appropriate flap was drawn incorporating the defect. The area thus outlined was incised with a #15 scalpel blade. The skin margins were undermined to an appropriate distance in all directions utilizing iris scissors. Following this, the designed flap was carried into the primary defect and sutured into place.
Orbicularis Oris Muscle Flap Text: The defect edges were debeveled with a #15 scalpel blade.  Given that the defect affected the competency of the oral sphincter an orbicularis oris muscle flap was deemed most appropriate to restore this competency and normal muscle function.  Using a sterile surgical marker, an appropriate flap was drawn incorporating the defect. The area thus outlined was incised with a #15 scalpel blade. Following this, the designed flap was carried over into the primary defect and sutured into place.
Melolabial Transposition Flap Text: The defect edges were debeveled with a #15 scalpel blade. Given the location of the defect and the proximity to free margins a melolabial flap was deemed most appropriate. Using a sterile surgical marker, an appropriate melolabial transposition flap was drawn incorporating the defect. The area thus outlined was incised deep to adipose tissue with a #15 scalpel blade. The skin margins were undermined to an appropriate distance in all directions utilizing iris scissors. Following this, the designed flap was carried over into the primary defect and sutured into place.
Rectangular Flap Text: The defect edges were debeveled with a #15 scalpel blade. Given the location of the defect and the proximity to free margins a rectangular flap was deemed most appropriate. Using a sterile surgical marker, an appropriate rectangular flap was drawn incorporating the defect. The area thus outlined was incised deep to adipose tissue with a #15 scalpel blade. The skin margins were undermined to an appropriate distance in all directions utilizing iris scissors. Following this, the designed flap was carried over into the primary defect and sutured into place.
Rhombic Flap Text: The defect edges were debeveled with a #15 scalpel blade. Given the location of the defect and the proximity to free margins a rhombic flap was deemed most appropriate. Using a sterile surgical marker, an appropriate rhombic flap was drawn incorporating the defect. The area thus outlined was incised deep to adipose tissue with a #15 scalpel blade. The skin margins were undermined to an appropriate distance in all directions utilizing iris scissors. Following this, the designed flap was carried over into the primary defect and sutured into place.
Rhomboid Transposition Flap Text: The defect edges were debeveled with a #15 scalpel blade. Given the location of the defect and the proximity to free margins a rhomboid transposition flap was deemed most appropriate. Using a sterile surgical marker, an appropriate rhomboid flap was drawn incorporating the defect. The area thus outlined was incised deep to adipose tissue with a #15 scalpel blade. The skin margins were undermined to an appropriate distance in all directions utilizing iris scissors. Following this, the designed flap was carried over into the primary defect and sutured into place.
Bi-Rhombic Flap Text: The defect edges were debeveled with a #15 scalpel blade. Given the location of the defect and the proximity to free margins a bi-rhombic flap was deemed most appropriate. Using a sterile surgical marker, an appropriate rhombic flap was drawn incorporating the defect. The area thus outlined was incised deep to adipose tissue with a #15 scalpel blade. The skin margins were undermined to an appropriate distance in all directions utilizing iris scissors. Following this, the designed flap was carried over into the primary defect and sutured into place.
Helical Rim Advancement Flap Text: The defect edges were debeveled with a #15 blade scalpel.  Given the location of the defect and the proximity to free margins (helical rim) a double helical rim advancement flap was deemed most appropriate. Using a sterile surgical marker, the appropriate advancement flaps were drawn incorporating the defect and placing the expected incisions between the helical rim and antihelix where possible.  The area thus outlined was incised through and through with a #15 scalpel blade.  With a skin hook and iris scissors, the flaps were gently and sharply undermined and freed up. Folllowing this, the designed flaps were carried over into the primary defect and sutured into place.
Bilateral Helical Rim Advancement Flap Text: The defect edges were debeveled with a #15 blade scalpel.  Given the location of the defect and the proximity to free margins (helical rim) a bilateral helical rim advancement flap was deemed most appropriate. Using a sterile surgical marker, the appropriate advancement flaps were drawn incorporating the defect and placing the expected incisions between the helical rim and antihelix where possible.  The area thus outlined was incised through and through with a #15 scalpel blade.  With a skin hook and iris scissors, the flaps were gently and sharply undermined and freed up. Following this, the designed flaps were placed into the primary defect and sutured into place.
Ear Star Wedge Flap Text: The defect edges were debeveled with a #15 blade scalpel.  Given the location of the defect and the proximity to free margins (helical rim) an ear star wedge flap was deemed most appropriate. Using a sterile surgical marker, the appropriate flap was drawn incorporating the defect and placing the expected incisions between the helical rim and antihelix where possible.  The area thus outlined was incised through and through with a #15 scalpel blade. Following this, the designed flap was carried over into the primary defect and sutured into place.
Flip-Flop Flap Text: The defect edges were debeveled with a #15 blade scalpel.  Given the location of the defect and the proximity to free margins a flip-flop flap was deemed most appropriate. Using a sterile surgical marker, the appropriate flap was drawn incorporating the defect and placing the expected incisions between the helical rim and antihelix where possible.  The area thus outlined was incised through and through with a #15 scalpel blade. Following this, the designed flap was carried over into the primary defect and sutured into place.
Banner Transposition Flap Text: The defect edges were debeveled with a #15 scalpel blade. Given the location of the defect and the proximity to free margins a Banner transposition flap was deemed most appropriate. Using a sterile surgical marker, an appropriate flap was drawn around the defect. The area thus outlined was incised deep to adipose tissue with a #15 scalpel blade. The skin margins were undermined to an appropriate distance in all directions utilizing iris scissors. Following this, the designed flap was carried into the primary defect and sutured into place.
Bilobed Flap Text: The defect edges were debeveled with a #15 scalpel blade. Given the location of the defect and the proximity to free margins a bilobe flap was deemed most appropriate. Using a sterile surgical marker, an appropriate bilobe flap drawn around the defect. The area thus outlined was incised deep to adipose tissue with a #15 scalpel blade. The skin margins were undermined to an appropriate distance in all directions utilizing iris scissors. Following this, the designed flap was carried over into the primary defect and sutured into place.
Bilobed Transposition Flap Text: The defect edges were debeveled with a #15 scalpel blade. Given the location of the defect and the proximity to free margins a bilobed transposition flap was deemed most appropriate. Using a sterile surgical marker, an appropriate bilobe flap drawn around the defect. The area thus outlined was incised deep to adipose tissue with a #15 scalpel blade. The skin margins were undermined to an appropriate distance in all directions utilizing iris scissors. Following this, the designed flap was carried over into the primary defect and sutured into place.
Trilobed Flap Text: The defect edges were debeveled with a #15 scalpel blade. Given the location of the defect and the proximity to free margins a trilobed flap was deemed most appropriate. Using a sterile surgical marker, an appropriate trilobed flap was drawn around the defect. The area thus outlined was incised deep to adipose tissue with a #15 scalpel blade. The skin margins were undermined to an appropriate distance in all directions utilizing iris scissors. Following this, the designed flap was carried into the primary defect and sutured into place.
Dorsal Nasal Flap Text: The defect edges were debeveled with a #15 scalpel blade. Given the location of the defect and the proximity to free margins a dorsal nasal flap was deemed most appropriate. Using a sterile surgical marker, an appropriate dorsal nasal flap was drawn around the defect. The area thus outlined was incised deep to adipose tissue with a #15 scalpel blade. The skin margins were undermined to an appropriate distance in all directions utilizing iris scissors. Following this, the designed flap was carried into the primary defect and sutured into place.
Island Pedicle Flap Text: The defect edges were debeveled with a #15 scalpel blade. Given the location of the defect, shape of the defect and the proximity to free margins an island pedicle advancement flap was deemed most appropriate. Using a sterile surgical marker, an appropriate advancement flap was drawn incorporating the defect, outlining the appropriate donor tissue and placing the expected incisions within the relaxed skin tension lines where possible. The area thus outlined was incised deep to adipose tissue with a #15 scalpel blade. The skin margins were undermined to an appropriate distance in all directions around the primary defect and laterally outward around the island pedicle utilizing iris scissors.  There was minimal undermining beneath the pedicle flap. Following this, the flap was carried over into the primary defect and sutured into place.
Island Pedicle Flap With Canthal Suspension Text: The defect edges were debeveled with a #15 scalpel blade. Given the location of the defect, shape of the defect and the proximity to free margins an island pedicle advancement flap was deemed most appropriate. Using a sterile surgical marker, an appropriate advancement flap was drawn incorporating the defect, outlining the appropriate donor tissue and placing the expected incisions within the relaxed skin tension lines where possible. The area thus outlined was incised deep to adipose tissue with a #15 scalpel blade. The skin margins were undermined to an appropriate distance in all directions around the primary defect and laterally outward around the island pedicle utilizing iris scissors.  There was minimal undermining beneath the pedicle flap. A suspension suture was placed in the canthal tendon to prevent tension and prevent ectropion. Following this, the designed flap was placed into the primary defect and sutured into place.
Alar Island Pedicle Flap Text: The defect edges were debeveled with a #15 scalpel blade. Given the location of the defect, shape of the defect and the proximity to the alar rim an island pedicle advancement flap was deemed most appropriate. Using a sterile surgical marker, an appropriate advancement flap was drawn incorporating the defect, outlining the appropriate donor tissue and placing the expected incisions within the nasal ala running parallel to the alar rim. The area thus outlined was incised with a #15 scalpel blade. The skin margins were undermined minimally to an appropriate distance in all directions around the primary defect and laterally outward around the island pedicle utilizing iris scissors.  There was minimal undermining beneath the pedicle flap. Following this, the designed flap was carried over into the primary defect and sutured into place.
Double Island Pedicle Flap Text: The defect edges were debeveled with a #15 scalpel blade. Given the location of the defect, shape of the defect and the proximity to free margins a double island pedicle advancement flap was deemed most appropriate. Using a sterile surgical marker, an appropriate advancement flap was drawn incorporating the defect, outlining the appropriate donor tissue and placing the expected incisions within the relaxed skin tension lines where possible. The area thus outlined was incised deep to adipose tissue with a #15 scalpel blade. The skin margins were undermined to an appropriate distance in all directions around the primary defect and laterally outward around the island pedicle utilizing iris scissors.  There was minimal undermining beneath the pedicle flap. Following this, the flap was carried over into the primary defect and sutured into place.
Island Pedicle Flap-Requiring Vessel Identification Text: The defect edges were debeveled with a #15 scalpel blade. Given the location of the defect, shape of the defect and the proximity to free margins an island pedicle advancement flap was deemed most appropriate. Using a sterile surgical marker, an appropriate advancement flap was drawn, based on the axial vessel mentioned above, incorporating the defect, outlining the appropriate donor tissue and placing the expected incisions within the relaxed skin tension lines where possible. The area thus outlined was incised deep to adipose tissue with a #15 scalpel blade. The skin margins were undermined to an appropriate distance in all directions around the primary defect and laterally outward around the island pedicle utilizing iris scissors.  There was minimal undermining beneath the pedicle flap. Following this, the designed flap was carried over into the primary defect and sutured into place.
Keystone Flap Text: The defect edges were debeveled with a #15 scalpel blade. Given the location of the defect, shape of the defect a keystone flap was deemed most appropriate. Using a sterile surgical marker, an appropriate keystone flap was drawn incorporating the defect, outlining the appropriate donor tissue and placing the expected incisions within the relaxed skin tension lines where possible. The area thus outlined was incised deep to adipose tissue with a #15 scalpel blade. The skin margins were undermined to an appropriate distance in all directions around the primary defect and laterally outward around the flap utilizing iris scissors. Following this, the designed flap was carried into the primary defect and sutured into place.
O-T Plasty Text: The defect edges were debeveled with a #15 scalpel blade. Given the location of the defect, shape of the defect and the proximity to free margins an O-T plasty was deemed most appropriate. Using a sterile surgical marker, an appropriate O-T plasty was drawn incorporating the defect and placing the expected incisions within the relaxed skin tension lines where possible. The area thus outlined was incised deep to adipose tissue with a #15 scalpel blade. The skin margins were undermined to an appropriate distance in all directions utilizing iris scissors. Following this, the designed flap was carried over into the primary defect and sutured into place.
O-Z Plasty Text: The defect edges were debeveled with a #15 scalpel blade. Given the location of the defect, shape of the defect and the proximity to free margins an O-Z plasty (double transposition flap) was deemed most appropriate. Using a sterile surgical marker, the appropriate transposition flaps were drawn incorporating the defect and placing the expected incisions within the relaxed skin tension lines where possible. The area thus outlined was incised deep to adipose tissue with a #15 scalpel blade. The skin margins were undermined to an appropriate distance in all directions utilizing iris scissors. Hemostasis was achieved with electrocautery. The flaps were then transposed and carried over into place, one clockwise and the other counterclockwise, and anchored with interrupted buried subcutaneous sutures.
Double O-Z Plasty Text: The defect edges were debeveled with a #15 scalpel blade. Given the location of the defect, shape of the defect and the proximity to free margins a Double O-Z plasty (double transposition flap) was deemed most appropriate. Using a sterile surgical marker, the appropriate transposition flaps were drawn incorporating the defect and placing the expected incisions within the relaxed skin tension lines where possible. The area thus outlined was incised deep to adipose tissue with a #15 scalpel blade. The skin margins were undermined to an appropriate distance in all directions utilizing iris scissors. Hemostasis was achieved with electrocautery. The flaps were then transposed and carried over into place, one clockwise and the other counterclockwise, and anchored with interrupted buried subcutaneous sutures.
V-Y Plasty Text: The defect edges were debeveled with a #15 scalpel blade. Given the location of the defect, shape of the defect and the proximity to free margins an V-Y advancement flap was deemed most appropriate. Using a sterile surgical marker, an appropriate advancement flap was drawn incorporating the defect and placing the expected incisions within the relaxed skin tension lines where possible. The area thus outlined was incised deep to adipose tissue with a #15 scalpel blade. The skin margins were undermined to an appropriate distance in all directions utilizing iris scissors. Following this, the designed flap was advanced and carried over into the primary defect and sutured into place.
H Plasty Text: Given the location of the defect, shape of the defect and the proximity to free margins a H-plasty was deemed most appropriate for repair. Using a sterile surgical marker, the appropriate advancement arms of the H-plasty were drawn incorporating the defect and placing the expected incisions within the relaxed skin tension lines where possible. The area thus outlined was incised deep to adipose tissue with a #15 scalpel blade. The skin margins were undermined to an appropriate distance in all directions utilizing iris scissors.  The opposing advancement arms were then advanced and carried over into place in opposite direction and anchored with interrupted buried subcutaneous sutures.
W Plasty Text: The lesion was extirpated to the level of the fat with a #15 scalpel blade. Given the location of the defect, shape of the defect and the proximity to free margins a W-plasty was deemed most appropriate for repair. Using a sterile surgical marker, the appropriate transposition arms of the W-plasty were drawn incorporating the defect and placing the expected incisions within the relaxed skin tension lines where possible. The area thus outlined was incised deep to adipose tissue with a #15 scalpel blade. The skin margins were undermined to an appropriate distance in all directions utilizing iris scissors. The opposing transposition arms were then transposed and carried over into place in opposite direction and anchored with interrupted buried subcutaneous sutures.
Z Plasty Text: The lesion was extirpated to the level of the fat with a #15 scalpel blade. Given the location of the defect, shape of the defect and the proximity to free margins a Z-plasty was deemed most appropriate for repair. Using a sterile surgical marker, the appropriate transposition arms of the Z-plasty were drawn incorporating the defect and placing the expected incisions within the relaxed skin tension lines where possible. The area thus outlined was incised deep to adipose tissue with a #15 scalpel blade. The skin margins were undermined to an appropriate distance in all directions utilizing iris scissors. The opposing transposition arms were then transposed and carried over into place in opposite direction and anchored with interrupted buried subcutaneous sutures.
Double Z Plasty Text: The lesion was extirpated to the level of the fat with a #15 scalpel blade. Given the location of the defect, shape of the defect and the proximity to free margins a double Z-plasty was deemed most appropriate for repair. Using a sterile surgical marker, the appropriate transposition arms of the double Z-plasty were drawn incorporating the defect and placing the expected incisions within the relaxed skin tension lines where possible. The area thus outlined was incised deep to adipose tissue with a #15 scalpel blade. The skin margins were undermined to an appropriate distance in all directions utilizing iris scissors. The opposing transposition arms were then transposed and carried over into place in opposite direction and anchored with interrupted buried subcutaneous sutures.
Zygomaticofacial Flap Text: Given the location of the defect, shape of the defect and the proximity to free margins a zygomaticofacial flap was deemed most appropriate for repair. Using a sterile surgical marker, the appropriate flap was drawn incorporating the defect and placing the expected incisions within the relaxed skin tension lines where possible. The area thus outlined was incised deep to adipose tissue with a #15 scalpel blade with preservation of a vascular pedicle.  The skin margins were undermined to an appropriate distance in all directions utilizing iris scissors. The flap was then carried over into the defect and anchored with interrupted buried subcutaneous sutures.
Cheek Interpolation Flap Text: A decision was made to reconstruct the defect utilizing an interpolation axial flap and a staged reconstruction.  A telfa template was made of the defect.  This telfa template was then used to outline the Cheek Interpolation flap.  The donor area for the pedicle flap was then injected with anesthesia.  The flap was excised through the skin and subcutaneous tissue down to the layer of the underlying musculature.  The interpolation flap was carefully excised within this deep plane to maintain its blood supply.  The edges of the donor site were undermined.   The donor site was closed in a primary fashion.  The pedicle was then rotated into position and sutured.  Once the tube was sutured into place, adequate blood supply was confirmed with blanching and refill.  The pedicle was then wrapped with xeroform gauze and dressed appropriately with a telfa and gauze bandage to ensure continued blood supply and protect the attached pedicle.
Cheek-To-Nose Interpolation Flap Text: A decision was made to reconstruct the defect utilizing an interpolation axial flap and a staged reconstruction.  A telfa template was made of the defect.  This telfa template was then used to outline the Cheek-To-Nose Interpolation flap.  The donor area for the pedicle flap was then injected with anesthesia.  The flap was excised through the skin and subcutaneous tissue down to the layer of the underlying musculature.  The interpolation flap was carefully excised within this deep plane to maintain its blood supply.  The edges of the donor site were undermined.   The donor site was closed in a primary fashion.  The pedicle was then rotated into position and sutured.  Once the tube was sutured into place, adequate blood supply was confirmed with blanching and refill.  The pedicle was then wrapped with xeroform gauze and dressed appropriately with a telfa and gauze bandage to ensure continued blood supply and protect the attached pedicle.
Interpolation Flap Text: A decision was made to reconstruct the defect utilizing an interpolation axial flap and a staged reconstruction.  A telfa template was made of the defect.  This telfa template was then used to outline the interpolation flap.  The donor area for the pedicle flap was then injected with anesthesia.  The flap was excised through the skin and subcutaneous tissue down to the layer of the underlying musculature.  The interpolation flap was carefully excised within this deep plane to maintain its blood supply.  The edges of the donor site were undermined.   The donor site was closed in a primary fashion.  The pedicle was then rotated into position and sutured.  Once the tube was sutured into place, adequate blood supply was confirmed with blanching and refill.  The pedicle was then wrapped with xeroform gauze and dressed appropriately with a telfa and gauze bandage to ensure continued blood supply and protect the attached pedicle.
Melolabial Interpolation Flap Text: A decision was made to reconstruct the defect utilizing an interpolation axial flap and a staged reconstruction.  A telfa template was made of the defect.  This telfa template was then used to outline the melolabial interpolation flap.  The donor area for the pedicle flap was then injected with anesthesia.  The flap was excised through the skin and subcutaneous tissue down to the layer of the underlying musculature.  The pedicle flap was carefully excised within this deep plane to maintain its blood supply.  The edges of the donor site were undermined.   The donor site was closed in a primary fashion.  The pedicle was then rotated into position and sutured.  Once the tube was sutured into place, adequate blood supply was confirmed with blanching and refill.  The pedicle was then wrapped with xeroform gauze and dressed appropriately with a telfa and gauze bandage to ensure continued blood supply and protect the attached pedicle.
Mastoid Interpolation Flap Text: A decision was made to reconstruct the defect utilizing an interpolation axial flap and a staged reconstruction.  A telfa template was made of the defect.  This telfa template was then used to outline the mastoid interpolation flap.  The donor area for the pedicle flap was then injected with anesthesia.  The flap was excised through the skin and subcutaneous tissue down to the layer of the underlying musculature.  The pedicle flap was carefully excised within this deep plane to maintain its blood supply.  The edges of the donor site were undermined.   The donor site was closed in a primary fashion.  The pedicle was then rotated into position and sutured.  Once the tube was sutured into place, adequate blood supply was confirmed with blanching and refill.  The pedicle was then wrapped with xeroform gauze and dressed appropriately with a telfa and gauze bandage to ensure continued blood supply and protect the attached pedicle.
Posterior Auricular Interpolation Flap Text: A decision was made to reconstruct the defect utilizing an interpolation axial flap and a staged reconstruction.  A telfa template was made of the defect.  This telfa template was then used to outline the posterior auricular interpolation flap.  The donor area for the pedicle flap was then injected with anesthesia.  The flap was excised through the skin and subcutaneous tissue down to the layer of the underlying musculature.  The pedicle flap was carefully excised within this deep plane to maintain its blood supply.  The edges of the donor site were undermined.   The donor site was closed in a primary fashion.  The pedicle was then rotated into position and sutured.  Once the tube was sutured into place, adequate blood supply was confirmed with blanching and refill.  The pedicle was then wrapped with xeroform gauze and dressed appropriately with a telfa and gauze bandage to ensure continued blood supply and protect the attached pedicle.
Paramedian Forehead Flap Text: A decision was made to reconstruct the defect utilizing an interpolation axial flap and a staged reconstruction.  A telfa template was made of the defect.  This telfa template was then used to outline the paramedian forehead pedicle flap.  The donor area for the pedicle flap was then injected with anesthesia.  The flap was excised through the skin and subcutaneous tissue down to the layer of the underlying musculature.  The pedicle flap was carefully excised within this deep plane to maintain its blood supply.  The edges of the donor site were undermined.   The donor site was closed in a primary fashion.  The pedicle was then rotated into position and sutured.  Once the tube was sutured into place, adequate blood supply was confirmed with blanching and refill.  The pedicle was then wrapped with xeroform gauze and dressed appropriately with a telfa and gauze bandage to ensure continued blood supply and protect the attached pedicle.
Abbe Flap (Upper To Lower Lip) Text: The defect of the lower lip was assessed and measured.  Given the location and size of the defect, an Abbe flap was deemed most appropriate. Using a sterile surgical marker, an appropriate Abbe flap was measured and drawn on the upper lip. Local anesthesia was then infiltrated.  A scalpel was then used to incise the upper lip through and through the skin, vermilion, muscle and mucosa, leaving the flap pedicled on the opposite side.  The flap was then rotated and transferred to the lower lip defect.  The flap was then sutured into place with a three layer technique, closing the orbicularis oris muscle layer with subcutaneous buried sutures, followed by a mucosal layer and an epidermal layer.
Abbe Flap (Lower To Upper Lip) Text: The defect of the upper lip was assessed and measured.  Given the location and size of the defect, an Abbe flap was deemed most appropriate. Using a sterile surgical marker, an appropriate Abbe flap was measured and drawn on the lower lip. Local anesthesia was then infiltrated. A scalpel was then used to incise the upper lip through and through the skin, vermilion, muscle and mucosa, leaving the flap pedicled on the opposite side.  The flap was then rotated and transferred to the lower lip defect.  The flap was then sutured into place with a three layer technique, closing the orbicularis oris muscle layer with subcutaneous buried sutures, followed by a mucosal layer and an epidermal layer.
Estlander Flap (Upper To Lower Lip) Text: The defect of the lower lip was assessed and measured.  Given the location and size of the defect, an Estlander flap was deemed most appropriate. Using a sterile surgical marker, an appropriate Estlander flap was measured and drawn on the upper lip. Local anesthesia was then infiltrated. A scalpel was then used to incise the lateral aspect of the flap, through skin, muscle and mucosa, leaving the flap pedicled medially.  The flap was then rotated and positioned to fill the lower lip defect.  The flap was then sutured into place with a three layer technique, closing the orbicularis oris muscle layer with subcutaneous buried sutures, followed by a mucosal layer and an epidermal layer.
Lip Wedge Excision Repair Text: Given the location of the defect and the proximity to free margins a full thickness wedge repair was deemed most appropriate. Using a sterile surgical marker, the appropriate repair was drawn incorporating the defect and placing the expected incisions perpendicular to the vermilion border.  The vermilion border was also meticulously outlined to ensure appropriate reapproximation during the repair.  The area thus outlined was incised through and through with a #15 scalpel blade.  The muscularis and dermis were reaproximated with deep sutures following hemostasis. Care was taken to realign the vermilion border before proceeding with the superficial closure.  Once the vermilion was realigned the superfical and mucosal closure was finished.
Ftsg Text: The defect edges were debeveled with a #15 scalpel blade. Given the location of the defect, shape of the defect and the proximity to free margins a full thickness skin graft was deemed most appropriate. Using a sterile surgical marker, the primary defect shape was transferred to the donor site. The area thus outlined was incised deep to adipose tissue with a #15 scalpel blade.  The harvested graft was then trimmed of adipose tissue until only dermis and epidermis was left.  The skin graft was then placed in the primary defect and oriented appropriately.
Split-Thickness Skin Graft Text: The defect edges were debeveled with a #15 scalpel blade. Given the location of the defect, shape of the defect and the proximity to free margins a split thickness skin graft was deemed most appropriate. Using a sterile surgical marker, the primary defect shape was transferred to the donor site. The split thickness graft was then harvested.  The skin graft was then placed in the primary defect and oriented appropriately.
Pinch Graft Text: The defect edges were debeveled with a #15 scalpel blade. Given the location of the defect, shape of the defect and the proximity to free margins a pinch graft was deemed most appropriate. Using a sterile surgical marker, the primary defect shape was transferred to the donor site. The area thus outlined was incised deep to adipose tissue with a #15 scalpel blade.  The harvested graft was then trimmed of adipose tissue until only dermis and epidermis was left. The skin graft was then placed in the primary defect and oriented appropriately.
Burow's Graft Text: The defect edges were debeveled with a #15 scalpel blade. Given the location of the defect, shape of the defect, the proximity to free margins and the presence of a standing cone deformity a Burow's skin graft was deemed most appropriate. The standing cone was removed and this tissue was then trimmed to the shape of the primary defect. The adipose tissue was also removed until only dermis and epidermis were left.  The skin graft was then placed in the primary defect and oriented appropriately.
Cartilage Graft Text: The defect edges were debeveled with a #15 scalpel blade. Given the location of the defect, shape of the defect, the fact the defect involved a full thickness cartilage defect a cartilage graft was deemed most appropriate.  An appropriate donor site was identified, cleansed, and anesthetized. The cartilage graft was then harvested and transferred to the recipient site, oriented appropriately and then sutured into place.  The secondary defect was then repaired using a primary closure.
Composite Graft Text: The defect edges were debeveled with a #15 scalpel blade. Given the location of the defect, shape of the defect, the proximity to free margins and the fact the defect was full thickness a composite graft was deemed most appropriate.  The defect was outline and then transferred to the donor site.  A full thickness graft was then excised from the donor site. The graft was then placed in the primary defect, oriented appropriately and then sutured into place.  The secondary defect was then repaired using a primary closure.
Epidermal Autograft Text: The defect edges were debeveled with a #15 scalpel blade. Given the location of the defect, shape of the defect and the proximity to free margins an epidermal autograft was deemed most appropriate. Using a sterile surgical marker, the primary defect shape was transferred to the donor site. The epidermal graft was then harvested.  The skin graft was then placed in the primary defect and oriented appropriately.
Dermal Autograft Text: The defect edges were debeveled with a #15 scalpel blade. Given the location of the defect, shape of the defect and the proximity to free margins a dermal autograft was deemed most appropriate. Using a sterile surgical marker, the primary defect shape was transferred to the donor site. The area thus outlined was incised deep to adipose tissue with a #15 scalpel blade.  The harvested graft was then trimmed of adipose and epidermal tissue until only dermis was left.  The skin graft was then placed in the primary defect and oriented appropriately.
Skin Substitute Text: The defect edges were debeveled with a #15 scalpel blade. Given the location of the defect, shape of the defect and the proximity to free margins a skin substitute graft was deemed most appropriate.  The graft material was trimmed to fit the size of the defect. The graft was then placed in the primary defect and oriented appropriately.
Tissue Cultured Epidermal Autograft Text: The defect edges were debeveled with a #15 scalpel blade. Given the location of the defect, shape of the defect and the proximity to free margins a tissue cultured epidermal autograft was deemed most appropriate.  The graft was then trimmed to fit the size of the defect.  The graft was then placed in the primary defect and oriented appropriately.
Xenograft Text: The defect edges were debeveled with a #15 scalpel blade. Given the location of the defect, shape of the defect and the proximity to free margins a xenograft was deemed most appropriate.  The graft was then trimmed to fit the size of the defect.  The graft was then placed in the primary defect and oriented appropriately.
Purse String (Intermediate) Text: Given the location of the defect and the characteristics of the surrounding skin a purse string intermediate closure was deemed most appropriate.  Undermining was performed circumferentially around the surgical defect.  A purse string suture was then placed and tightened.
Purse String (Simple) Text: Given the location of the defect and the characteristics of the surrounding skin a purse string simple closure was deemed most appropriate.  Undermining was performed circumferentially around the surgical defect.  A purse string suture was then placed and tightened.
Complex Repair And Single Advancement Flap Text: The defect edges were debeveled with a #15 scalpel blade.  The primary defect was closed partially with a complex linear closure.  Given the location of the remaining defect, shape of the defect and the proximity to free margins a single advancement flap was deemed most appropriate for complete closure of the defect.  Using a sterile surgical marker, an appropriate advancement flap was drawn incorporating the defect and placing the expected incisions within the relaxed skin tension lines where possible. The area thus outlined was incised deep to adipose tissue with a #15 scalpel blade. The skin margins were undermined to an appropriate distance in all directions utilizing iris scissors and carried over to close the primary defect.
Complex Repair And Double Advancement Flap Text: The defect edges were debeveled with a #15 scalpel blade.  The primary defect was closed partially with a complex linear closure.  Given the location of the remaining defect, shape of the defect and the proximity to free margins a double advancement flap was deemed most appropriate for complete closure of the defect.  Using a sterile surgical marker, an appropriate advancement flap was drawn incorporating the defect and placing the expected incisions within the relaxed skin tension lines where possible. The area thus outlined was incised deep to adipose tissue with a #15 scalpel blade. The skin margins were undermined to an appropriate distance in all directions utilizing iris scissors and carried over to close the primary defect.
Complex Repair And Modified Advancement Flap Text: The defect edges were debeveled with a #15 scalpel blade.  The primary defect was closed partially with a complex linear closure.  Given the location of the remaining defect, shape of the defect and the proximity to free margins a modified advancement flap was deemed most appropriate for complete closure of the defect.  Using a sterile surgical marker, an appropriate advancement flap was drawn incorporating the defect and placing the expected incisions within the relaxed skin tension lines where possible. The area thus outlined was incised deep to adipose tissue with a #15 scalpel blade. The skin margins were undermined to an appropriate distance in all directions utilizing iris scissors and carried over to close the primary defect.
Complex Repair And A-T Advancement Flap Text: The defect edges were debeveled with a #15 scalpel blade.  The primary defect was closed partially with a complex linear closure.  Given the location of the remaining defect, shape of the defect and the proximity to free margins an A-T advancement flap was deemed most appropriate for complete closure of the defect.  Using a sterile surgical marker, an appropriate advancement flap was drawn incorporating the defect and placing the expected incisions within the relaxed skin tension lines where possible. The area thus outlined was incised deep to adipose tissue with a #15 scalpel blade. The skin margins were undermined to an appropriate distance in all directions utilizing iris scissors and carried over to close the primary defect.
Complex Repair And O-T Advancement Flap Text: The defect edges were debeveled with a #15 scalpel blade.  The primary defect was closed partially with a complex linear closure.  Given the location of the remaining defect, shape of the defect and the proximity to free margins an O-T advancement flap was deemed most appropriate for complete closure of the defect.  Using a sterile surgical marker, an appropriate advancement flap was drawn incorporating the defect and placing the expected incisions within the relaxed skin tension lines where possible. The area thus outlined was incised deep to adipose tissue with a #15 scalpel blade. The skin margins were undermined to an appropriate distance in all directions utilizing iris scissors and carried over to close the primary defect.
Complex Repair And O-L Flap Text: The defect edges were debeveled with a #15 scalpel blade.  The primary defect was closed partially with a complex linear closure.  Given the location of the remaining defect, shape of the defect and the proximity to free margins an O-L flap was deemed most appropriate for complete closure of the defect.  Using a sterile surgical marker, an appropriate flap was drawn incorporating the defect and placing the expected incisions within the relaxed skin tension lines where possible. The area thus outlined was incised deep to adipose tissue with a #15 scalpel blade. The skin margins were undermined to an appropriate distance in all directions utilizing iris scissors and carried over to close the primary defect.
Complex Repair And Bilobe Flap Text: The defect edges were debeveled with a #15 scalpel blade.  The primary defect was closed partially with a complex linear closure.  Given the location of the remaining defect, shape of the defect and the proximity to free margins a bilobe flap was deemed most appropriate for complete closure of the defect.  Using a sterile surgical marker, an appropriate advancement flap was drawn incorporating the defect and placing the expected incisions within the relaxed skin tension lines where possible. The area thus outlined was incised deep to adipose tissue with a #15 scalpel blade. The skin margins were undermined to an appropriate distance in all directions utilizing iris scissors and carried over to close the primary defect.
Complex Repair And Melolabial Flap Text: The defect edges were debeveled with a #15 scalpel blade.  The primary defect was closed partially with a complex linear closure.  Given the location of the remaining defect, shape of the defect and the proximity to free margins a melolabial flap was deemed most appropriate for complete closure of the defect.  Using a sterile surgical marker, an appropriate advancement flap was drawn incorporating the defect and placing the expected incisions within the relaxed skin tension lines where possible. The area thus outlined was incised deep to adipose tissue with a #15 scalpel blade. The skin margins were undermined to an appropriate distance in all directions utilizing iris scissors and carried over to close the primary defect.
Complex Repair And Rotation Flap Text: The defect edges were debeveled with a #15 scalpel blade.  The primary defect was closed partially with a complex linear closure.  Given the location of the remaining defect, shape of the defect and the proximity to free margins a rotation flap was deemed most appropriate for complete closure of the defect.  Using a sterile surgical marker, an appropriate advancement flap was drawn incorporating the defect and placing the expected incisions within the relaxed skin tension lines where possible. The area thus outlined was incised deep to adipose tissue with a #15 scalpel blade. The skin margins were undermined to an appropriate distance in all directions utilizing iris scissors and carried over to close the primary defect.
Complex Repair And Rhombic Flap Text: The defect edges were debeveled with a #15 scalpel blade.  The primary defect was closed partially with a complex linear closure.  Given the location of the remaining defect, shape of the defect and the proximity to free margins a rhombic flap was deemed most appropriate for complete closure of the defect.  Using a sterile surgical marker, an appropriate advancement flap was drawn incorporating the defect and placing the expected incisions within the relaxed skin tension lines where possible. The area thus outlined was incised deep to adipose tissue with a #15 scalpel blade. The skin margins were undermined to an appropriate distance in all directions utilizing iris scissors and carried over to close the primary defect.
Complex Repair And Transposition Flap Text: The defect edges were debeveled with a #15 scalpel blade.  The primary defect was closed partially with a complex linear closure.  Given the location of the remaining defect, shape of the defect and the proximity to free margins a transposition flap was deemed most appropriate for complete closure of the defect.  Using a sterile surgical marker, an appropriate advancement flap was drawn incorporating the defect and placing the expected incisions within the relaxed skin tension lines where possible. The area thus outlined was incised deep to adipose tissue with a #15 scalpel blade. The skin margins were undermined to an appropriate distance in all directions utilizing iris scissors and carried over to close the primary defect.
Complex Repair And V-Y Plasty Text: The defect edges were debeveled with a #15 scalpel blade.  The primary defect was closed partially with a complex linear closure.  Given the location of the remaining defect, shape of the defect and the proximity to free margins a V-Y plasty was deemed most appropriate for complete closure of the defect.  Using a sterile surgical marker, an appropriate advancement flap was drawn incorporating the defect and placing the expected incisions within the relaxed skin tension lines where possible. The area thus outlined was incised deep to adipose tissue with a #15 scalpel blade. The skin margins were undermined to an appropriate distance in all directions utilizing iris scissors and carried over to close the primary defect.
Complex Repair And M Plasty Text: The defect edges were debeveled with a #15 scalpel blade.  The primary defect was closed partially with a complex linear closure.  Given the location of the remaining defect, shape of the defect and the proximity to free margins an M plasty was deemed most appropriate for complete closure of the defect.  Using a sterile surgical marker, an appropriate advancement flap was drawn incorporating the defect and placing the expected incisions within the relaxed skin tension lines where possible. The area thus outlined was incised deep to adipose tissue with a #15 scalpel blade. The skin margins were undermined to an appropriate distance in all directions utilizing iris scissors and carried over to close the primary defect.
Complex Repair And Double M Plasty Text: The defect edges were debeveled with a #15 scalpel blade.  The primary defect was closed partially with a complex linear closure.  Given the location of the remaining defect, shape of the defect and the proximity to free margins a double M plasty was deemed most appropriate for complete closure of the defect.  Using a sterile surgical marker, an appropriate advancement flap was drawn incorporating the defect and placing the expected incisions within the relaxed skin tension lines where possible. The area thus outlined was incised deep to adipose tissue with a #15 scalpel blade. The skin margins were undermined to an appropriate distance in all directions utilizing iris scissors and carried over to close the primary defect.
Complex Repair And W Plasty Text: The defect edges were debeveled with a #15 scalpel blade.  The primary defect was closed partially with a complex linear closure.  Given the location of the remaining defect, shape of the defect and the proximity to free margins a W plasty was deemed most appropriate for complete closure of the defect.  Using a sterile surgical marker, an appropriate advancement flap was drawn incorporating the defect and placing the expected incisions within the relaxed skin tension lines where possible. The area thus outlined was incised deep to adipose tissue with a #15 scalpel blade. The skin margins were undermined to an appropriate distance in all directions utilizing iris scissors and carried over to close the primary defect.
Complex Repair And Z Plasty Text: The defect edges were debeveled with a #15 scalpel blade.  The primary defect was closed partially with a complex linear closure.  Given the location of the remaining defect, shape of the defect and the proximity to free margins a Z plasty was deemed most appropriate for complete closure of the defect.  Using a sterile surgical marker, an appropriate advancement flap was drawn incorporating the defect and placing the expected incisions within the relaxed skin tension lines where possible. The area thus outlined was incised deep to adipose tissue with a #15 scalpel blade. The skin margins were undermined to an appropriate distance in all directions utilizing iris scissors and carried over to close the primary defect.
Complex Repair And Dorsal Nasal Flap Text: The defect edges were debeveled with a #15 scalpel blade.  The primary defect was closed partially with a complex linear closure.  Given the location of the remaining defect, shape of the defect and the proximity to free margins a dorsal nasal flap was deemed most appropriate for complete closure of the defect.  Using a sterile surgical marker, an appropriate flap was drawn incorporating the defect and placing the expected incisions within the relaxed skin tension lines where possible. The area thus outlined was incised deep to adipose tissue with a #15 scalpel blade. The skin margins were undermined to an appropriate distance in all directions utilizing iris scissors and carried over to close the primary defect.
Complex Repair And Ftsg Text: The defect edges were debeveled with a #15 scalpel blade.  The primary defect was closed partially with a complex linear closure.  Given the location of the defect, shape of the defect and the proximity to free margins a full thickness skin graft was deemed most appropriate to repair the remaining defect.  The graft was trimmed to fit the size of the remaining defect.  The graft was then placed in the primary defect, oriented appropriately, and sutured into place.
Complex Repair And Burow's Graft Text: The defect edges were debeveled with a #15 scalpel blade.  The primary defect was closed partially with a complex linear closure.  Given the location of the defect, shape of the defect, the proximity to free margins and the presence of a standing cone deformity a Burow's graft was deemed most appropriate to repair the remaining defect.  The graft was trimmed to fit the size of the remaining defect.  The graft was then placed in the primary defect, oriented appropriately, and sutured into place.
Complex Repair And Split-Thickness Skin Graft Text: The defect edges were debeveled with a #15 scalpel blade.  The primary defect was closed partially with a complex linear closure.  Given the location of the defect, shape of the defect and the proximity to free margins a split thickness skin graft was deemed most appropriate to repair the remaining defect.  The graft was trimmed to fit the size of the remaining defect.  The graft was then placed in the primary defect, oriented appropriately, and sutured into place.
Complex Repair And Epidermal Autograft Text: The defect edges were debeveled with a #15 scalpel blade.  The primary defect was closed partially with a complex linear closure.  Given the location of the defect, shape of the defect and the proximity to free margins an epidermal autograft was deemed most appropriate to repair the remaining defect.  The graft was trimmed to fit the size of the remaining defect.  The graft was then placed in the primary defect, oriented appropriately, and sutured into place.
Complex Repair And Dermal Autograft Text: The defect edges were debeveled with a #15 scalpel blade.  The primary defect was closed partially with a complex linear closure.  Given the location of the defect, shape of the defect and the proximity to free margins an dermal autograft was deemed most appropriate to repair the remaining defect.  The graft was trimmed to fit the size of the remaining defect.  The graft was then placed in the primary defect, oriented appropriately, and sutured into place.
Complex Repair And Tissue Cultured Epidermal Autograft Text: The defect edges were debeveled with a #15 scalpel blade.  The primary defect was closed partially with a complex linear closure.  Given the location of the defect, shape of the defect and the proximity to free margins an tissue cultured epidermal autograft was deemed most appropriate to repair the remaining defect.  The graft was trimmed to fit the size of the remaining defect.  The graft was then placed in the primary defect, oriented appropriately, and sutured into place.
Complex Repair And Xenograft Text: The defect edges were debeveled with a #15 scalpel blade.  The primary defect was closed partially with a complex linear closure.  Given the location of the defect, shape of the defect and the proximity to free margins a xenograft was deemed most appropriate to repair the remaining defect.  The graft was trimmed to fit the size of the remaining defect.  The graft was then placed in the primary defect, oriented appropriately, and sutured into place.
Complex Repair And Skin Substitute Graft Text: The defect edges were debeveled with a #15 scalpel blade.  The primary defect was closed partially with a complex linear closure.  Given the location of the remaining defect, shape of the defect and the proximity to free margins a skin substitute graft was deemed most appropriate to repair the remaining defect.  The graft was trimmed to fit the size of the remaining defect.  The graft was then placed in the primary defect, oriented appropriately, and sutured into place.
Path Notes (To The Dermatopathologist): Please check margins.
Consent was obtained from the patient. The risks and benefits to therapy were discussed in detail. Specifically, the risks of infection, scarring, bleeding, prolonged wound healing, incomplete removal, allergy to anesthesia, nerve injury and recurrence were addressed. Prior to the procedure, the treatment site was clearly identified and confirmed by the patient. All components of Universal Protocol/PAUSE Rule completed.
Render Post-Care Instructions In Note?: yes
Post-Care Instructions: I reviewed with the patient in detail post-care instructions. Patient is not to engage in any heavy lifting, exercise, or swimming for the next 14 days. Should the patient develop any fevers, chills, bleeding, severe pain patient will contact the office immediately.
Home Suture Removal Text: Patient was provided a home suture removal kit and will remove their sutures at home.  If they have any questions or difficulties they will call the office.
Where Do You Want The Question To Include Opioid Counseling Located?: Case Summary Tab
Billing Type: Third-Party Bill
Information: Selecting Yes will display possible errors in your note based on the variables you have selected. This validation is only offered as a suggestion for you. PLEASE NOTE THAT THE VALIDATION TEXT WILL BE REMOVED WHEN YOU FINALIZE YOUR NOTE. IF YOU WANT TO FAX A PRELIMINARY NOTE YOU WILL NEED TO TOGGLE THIS TO 'NO' IF YOU DO NOT WANT IT IN YOUR FAXED NOTE.

## 2024-11-07 ENCOUNTER — APPOINTMENT (OUTPATIENT)
Dept: URBAN - METROPOLITAN AREA CLINIC 254 | Age: 59
Setting detail: DERMATOLOGY
End: 2024-11-08

## 2024-11-07 DIAGNOSIS — Z48.02 ENCOUNTER FOR REMOVAL OF SUTURES: ICD-10-CM

## 2024-11-07 PROCEDURE — OTHER SUTURE REMOVAL (GLOBAL PERIOD): OTHER

## 2024-11-07 PROCEDURE — OTHER COUNSELING: OTHER

## 2024-11-07 ASSESSMENT — LOCATION ZONE DERM: LOCATION ZONE: LEG

## 2024-11-07 ASSESSMENT — LOCATION SIMPLE DESCRIPTION DERM: LOCATION SIMPLE: RIGHT PRETIBIAL REGION

## 2024-11-07 ASSESSMENT — LOCATION DETAILED DESCRIPTION DERM: LOCATION DETAILED: RIGHT DISTAL PRETIBIAL REGION

## 2024-11-07 NOTE — PROCEDURE: SUTURE REMOVAL (GLOBAL PERIOD)
Detail Level: Detailed
Add 81916 Cpt? (Important Note: In 2017 The Use Of 70528 Is Being Tracked By Cms To Determine Future Global Period Reimbursement For Global Periods): no

## 2025-02-27 ENCOUNTER — APPOINTMENT (OUTPATIENT)
Dept: URBAN - METROPOLITAN AREA CLINIC 254 | Age: 60
Setting detail: DERMATOLOGY
End: 2025-03-01

## 2025-02-27 VITALS — WEIGHT: 164 LBS | HEIGHT: 61 IN

## 2025-02-27 DIAGNOSIS — D18.0 HEMANGIOMA: ICD-10-CM

## 2025-02-27 DIAGNOSIS — L40.0 PSORIASIS VULGARIS: ICD-10-CM

## 2025-02-27 DIAGNOSIS — L81.4 OTHER MELANIN HYPERPIGMENTATION: ICD-10-CM

## 2025-02-27 DIAGNOSIS — D22 MELANOCYTIC NEVI: ICD-10-CM

## 2025-02-27 DIAGNOSIS — L57.8 OTHER SKIN CHANGES DUE TO CHRONIC EXPOSURE TO NONIONIZING RADIATION: ICD-10-CM

## 2025-02-27 DIAGNOSIS — Z71.89 OTHER SPECIFIED COUNSELING: ICD-10-CM

## 2025-02-27 DIAGNOSIS — D49.2 NEOPLASM OF UNSPECIFIED BEHAVIOR OF BONE, SOFT TISSUE, AND SKIN: ICD-10-CM

## 2025-02-27 DIAGNOSIS — L57.0 ACTINIC KERATOSIS: ICD-10-CM

## 2025-02-27 DIAGNOSIS — L82.1 OTHER SEBORRHEIC KERATOSIS: ICD-10-CM

## 2025-02-27 DIAGNOSIS — L82.0 INFLAMED SEBORRHEIC KERATOSIS: ICD-10-CM

## 2025-02-27 PROBLEM — D22.5 MELANOCYTIC NEVI OF TRUNK: Status: ACTIVE | Noted: 2025-02-27

## 2025-02-27 PROBLEM — D18.01 HEMANGIOMA OF SKIN AND SUBCUTANEOUS TISSUE: Status: ACTIVE | Noted: 2025-02-27

## 2025-02-27 PROCEDURE — OTHER ADDITIONAL NOTES: OTHER

## 2025-02-27 PROCEDURE — 17110 DESTRUCT B9 LESION 1-14: CPT

## 2025-02-27 PROCEDURE — OTHER PRESCRIPTION: OTHER

## 2025-02-27 PROCEDURE — 11102 TANGNTL BX SKIN SINGLE LES: CPT | Mod: 59

## 2025-02-27 PROCEDURE — OTHER COUNSELING: OTHER

## 2025-02-27 PROCEDURE — 99214 OFFICE O/P EST MOD 30 MIN: CPT | Mod: 25

## 2025-02-27 PROCEDURE — OTHER BIOPSY BY SHAVE METHOD: OTHER

## 2025-02-27 PROCEDURE — OTHER MIPS QUALITY: OTHER

## 2025-02-27 PROCEDURE — 17000 DESTRUCT PREMALG LESION: CPT | Mod: 59

## 2025-02-27 PROCEDURE — OTHER LIQUID NITROGEN: OTHER

## 2025-02-27 PROCEDURE — 17003 DESTRUCT PREMALG LES 2-14: CPT | Mod: 59

## 2025-02-27 RX ORDER — CLOBETASOL PROPIONATE 0.5 MG/G
0.05% CREAM TOPICAL BID
Qty: 60 | Refills: 3 | Status: ERX | COMMUNITY
Start: 2025-02-27

## 2025-02-27 ASSESSMENT — LOCATION ZONE DERM
LOCATION ZONE: FACE
LOCATION ZONE: LEG
LOCATION ZONE: TRUNK
LOCATION ZONE: ARM
LOCATION ZONE: NECK

## 2025-02-27 ASSESSMENT — LOCATION DETAILED DESCRIPTION DERM
LOCATION DETAILED: LEFT INFERIOR LATERAL UPPER BACK
LOCATION DETAILED: RIGHT PROXIMAL PRETIBIAL REGION
LOCATION DETAILED: LEFT SUPERIOR MEDIAL UPPER BACK
LOCATION DETAILED: LEFT POSTERIOR SHOULDER
LOCATION DETAILED: RIGHT INFERIOR LATERAL UPPER BACK
LOCATION DETAILED: RIGHT MEDIAL ZYGOMA
LOCATION DETAILED: LEFT PROXIMAL PRETIBIAL REGION
LOCATION DETAILED: LEFT INFERIOR POSTERIOR NECK
LOCATION DETAILED: RIGHT SUPERIOR LATERAL MALAR CHEEK
LOCATION DETAILED: RIGHT INFERIOR UPPER BACK
LOCATION DETAILED: RIGHT SUPERIOR UPPER BACK
LOCATION DETAILED: RIGHT CENTRAL MALAR CHEEK
LOCATION DETAILED: LEFT INFERIOR FOREHEAD
LOCATION DETAILED: LEFT MEDIAL UPPER BACK
LOCATION DETAILED: LEFT LATERAL TRAPEZIAL NECK

## 2025-02-27 ASSESSMENT — LOCATION SIMPLE DESCRIPTION DERM
LOCATION SIMPLE: RIGHT PRETIBIAL REGION
LOCATION SIMPLE: LEFT FOREHEAD
LOCATION SIMPLE: POSTERIOR NECK
LOCATION SIMPLE: RIGHT CHEEK
LOCATION SIMPLE: RIGHT ZYGOMA
LOCATION SIMPLE: RIGHT UPPER BACK
LOCATION SIMPLE: LEFT SHOULDER
LOCATION SIMPLE: LEFT PRETIBIAL REGION
LOCATION SIMPLE: LEFT UPPER BACK

## 2025-02-27 NOTE — PROCEDURE: COUNSELING
Detail Level: Zone
Detail Level: Detailed
Patient Specific Counseling (Will Not Stick From Patient To Patient): - Discussed that this nevus has atypical features that warrant a biopsy.\\n- Patient expressed understanding.\\n- Follow-up for re-examination for regimentation or an additional removal procedure may become necessary depending the pathologist's report.
Detail Level: Generalized

## 2025-02-27 NOTE — PROCEDURE: BIOPSY BY SHAVE METHOD
Electrodesiccation And Curettage Text: The wound bed was treated with electrodesiccation and curettage after the biopsy was performed.
Lab: -[id number]
Hemostasis: Drysol
Anesthesia Volume In Cc: 0.3
Silver Nitrate Text: The wound bed was treated with silver nitrate after the biopsy was performed.
Hide Additional Anticipated Plan?: Yes
Validate Lesion Size: No
Detail Level: Detailed
Consent: - Consent was obtained and risks were reviewed prior to procedure today. All questions were answered prior to procedure today.\\n- Risks discussed include but are not limited to scarring, infection, bleeding, scabbing, incomplete removal, nerve damage, pain, and allergy to anesthesia.
Type Of Destruction Used: Curettage
Information: Selecting Yes will display possible errors in your note based on the variables you have selected. This validation is only offered as a suggestion for you. PLEASE NOTE THAT THE VALIDATION TEXT WILL BE REMOVED WHEN YOU FINALIZE YOUR NOTE. IF YOU WANT TO FAX A PRELIMINARY NOTE YOU WILL NEED TO TOGGLE THIS TO 'NO' IF YOU DO NOT WANT IT IN YOUR FAXED NOTE.
Wound Care: Aquaphor
Billing Type: Third-Party Bill
Lab Facility: [id number]
Depth Of Biopsy: dermis
Curettage Text: The wound bed was treated with curettage after the biopsy was performed.
Cryotherapy Text: The wound bed was treated with cryotherapy after the biopsy was performed.
Post-Care Instructions: WOUND CARE:\\nDo NOT submerge wound in a bath, swimming pool, or hot tub for at least 1 week or for as long as there is an open wound. Gently cleanse the site daily with mild soap and water. Be careful NOT to allow a forceful stream of water to hit the biopsy site. After cleaning/showering, apply a thin layer of petrolatum ointment or Aquaphor in the wound followed by an adhesive bandage. Continue this process daily until healed. \\n\\nBLEEDING:\\nIf you develop persistent bleeding, apply firm and steady pressure over the dressing with gauze for 15 minutes. If bleeding persists, reapply pressure with an ice pack over the gauze for 15 minutes. NEVER APPLY ICE DIRECTLY TO THE SKIN. Do NOT peek under the gauze during these 15 minutes of pressure.  Call our office at [phone] if you cannot get the bleeding to stop. \\n\\nINFECTION:\\nSigns of infection may include increasing rather than decreasing pain (after the first few days), increasing redness/swelling/heat, draining pus, pink/red streaks around the wound, and fever or chills.  Please call our office immediately at [phone] if infection is suspected. It is normal to have some mild redness on or around the wound edges; this will lighten day by day and will become less tender.\\n\\nPAIN:\\nPain is usually minimal, but if needed you may take acetaminophen (Tylenol) every four hours as needed. Applying an ice pack over the dressing for 15-20 minutes every 2-3 hours will relieve swelling, lessen pain, and help minimize bruising. NEVER APPLY ICE DIRECTLY TO THE SKIN. Avoid ibuprofen (Advil, Motrin) and naproxen (Aleve) for the first 48 hours as these can increase bleeding.\\n\\nSWELLING AND BRUISING:\\nSwelling and bruising are common and temporary, usually lasting 1 - 2 weeks. It is more common in areas treated around the eyes, hands, and feet. In the days following the procedure, swelling and bruising can be minimized by keeping the affected areas elevated when possible, reducing salty foods, and applying ice packs over the dressing for 15-20 minutes every 2-3 hours. NEVER APPLY ICE DIRECTLY TO THE SKIN.\\n\\nITCHING:\\nItchiness on and around the wound is very common and can last several days to weeks after surgery. Mild itch is normal as the wound is healing. \\n\\nNERVE CHANGES:\\nNumbness is usually temporary, but it may last for several weeks to months. You may also experience sharp pains at the wound sight as it heals.  Mild pain is normal and will gradually improve with time.\\n \\nNO SMOKING:\\nSmoking will delay the healing process. If you smoke, we recommend trying to quit or at minimum reduce how much you smoke following a procedure.
Biopsy Type: H and E
Dressing: bandage
Anesthesia Type: 1% lidocaine with epinephrine
Electrodesiccation Text: The wound bed was treated with electrodesiccation after the biopsy was performed.
Notification Instructions: - It can take up to 2 -3 weeks to get a biopsy result. \\n- Please refrain from calling to request results until after 2 weeks.
Biopsy Method: Dermablade
Medical Necessity Information: It is in your best interest to select a reason for this procedure from the list below. All of these items fulfill various CMS LCD requirements except the new and changing color options.

## 2025-02-27 NOTE — HPI: FULL BODY SKIN EXAMINATION
What Type Of Note Output Would You Prefer (Optional)?: Standard Output
What Is The Reason For Today's Visit?: Full Body Skin Examination
What Is The Reason For Today's Visit? (Being Monitored For X): concerning skin lesions on an annual basis
My signature below certifies that the above stated patient is homebound and upon completion of the Face-To-Face encounter, has the need for intermittent skilled nursing, physical therapy and/or speech or occupational therapy services in their home for their current diagnosis as outlined in their initial plan of care. These services will continue to be monitored by myself or another physician.

## 2025-02-27 NOTE — PROCEDURE: LIQUID NITROGEN
Show Applicator Variable?: Yes
Duration Of Freeze Thaw-Cycle (Seconds): 0
Application Tool (Optional): Liquid Nitrogen Sprayer
Post-Care Instructions: I reviewed with the patient in detail post-care instructions. Patient is to wear sunprotection, and avoid picking at any of the treated lesions. Pt may apply Vaseline to crusted or scabbing areas.
Consent: The patient's consent was obtained including but not limited to risks of crusting, scabbing, blistering, scarring, darker or lighter pigmentary change, recurrence, incomplete removal and infection.
Render Note In Bullet Format When Appropriate: No
Detail Level: Detailed
Number Of Freeze-Thaw Cycles: 2 freeze-thaw cycles
Spray Paint Text: The liquid nitrogen was applied to the skin utilizing a spray paint frosting technique.
Medical Necessity Clause: This procedure was medically necessary because the lesions that were treated were:
Medical Necessity Information: It is in your best interest to select a reason for this procedure from the list below. All of these items fulfill various CMS LCD requirements except the new and changing color options.

## 2025-02-27 NOTE — PROCEDURE: ADDITIONAL NOTES
Detail Level: Simple
Render Risk Assessment In Note?: no
Additional Notes: - Second round of liquid nitrogen on the right malar cheek.

## 2025-08-28 ENCOUNTER — APPOINTMENT (OUTPATIENT)
Dept: URBAN - METROPOLITAN AREA CLINIC 254 | Age: 60
Setting detail: DERMATOLOGY
End: 2025-09-01

## 2025-08-28 VITALS — HEIGHT: 61 IN | WEIGHT: 162 LBS

## 2025-08-28 DIAGNOSIS — L57.8 OTHER SKIN CHANGES DUE TO CHRONIC EXPOSURE TO NONIONIZING RADIATION: ICD-10-CM

## 2025-08-28 DIAGNOSIS — Z71.89 OTHER SPECIFIED COUNSELING: ICD-10-CM

## 2025-08-28 DIAGNOSIS — L30.4 ERYTHEMA INTERTRIGO: ICD-10-CM

## 2025-08-28 DIAGNOSIS — L40.0 PSORIASIS VULGARIS: ICD-10-CM

## 2025-08-28 DIAGNOSIS — D18.0 HEMANGIOMA: ICD-10-CM

## 2025-08-28 DIAGNOSIS — L82.1 OTHER SEBORRHEIC KERATOSIS: ICD-10-CM

## 2025-08-28 DIAGNOSIS — L81.4 OTHER MELANIN HYPERPIGMENTATION: ICD-10-CM

## 2025-08-28 DIAGNOSIS — H02.84 EDEMA OF EYELID: ICD-10-CM

## 2025-08-28 DIAGNOSIS — D22 MELANOCYTIC NEVI: ICD-10-CM

## 2025-08-28 PROBLEM — H02.849 EDEMA OF UNSPECIFIED EYE, UNSPECIFIED EYELID: Status: ACTIVE | Noted: 2025-08-28

## 2025-08-28 PROBLEM — D22.5 MELANOCYTIC NEVI OF TRUNK: Status: ACTIVE | Noted: 2025-08-28

## 2025-08-28 PROBLEM — D18.01 HEMANGIOMA OF SKIN AND SUBCUTANEOUS TISSUE: Status: ACTIVE | Noted: 2025-08-28

## 2025-08-28 PROCEDURE — OTHER PRESCRIPTION: OTHER

## 2025-08-28 PROCEDURE — 99214 OFFICE O/P EST MOD 30 MIN: CPT

## 2025-08-28 PROCEDURE — OTHER ADDITIONAL NOTES: OTHER

## 2025-08-28 PROCEDURE — OTHER COUNSELING: OTHER

## 2025-08-28 RX ORDER — HYDROCORTISONE 25 MG/G
CREAM TOPICAL BID
Qty: 30 | Refills: 2 | Status: ERX | COMMUNITY
Start: 2025-08-28

## 2025-08-28 RX ORDER — KETOCONAZOLE 20 MG/G
CREAM TOPICAL BID
Qty: 30 | Refills: 2 | Status: ERX | COMMUNITY
Start: 2025-08-28

## 2025-08-28 ASSESSMENT — LOCATION DETAILED DESCRIPTION DERM
LOCATION DETAILED: LEFT PROXIMAL PRETIBIAL REGION
LOCATION DETAILED: RIGHT SUPERIOR MEDIAL UPPER BACK
LOCATION DETAILED: LEFT SUPERIOR MEDIAL UPPER BACK
LOCATION DETAILED: RIGHT MEDIAL UPPER BACK
LOCATION DETAILED: MID-OCCIPITAL SCALP
LOCATION DETAILED: LEFT ANTERIOR PROXIMAL THIGH
LOCATION DETAILED: RIGHT AXILLARY VAULT
LOCATION DETAILED: RIGHT SUPERIOR MEDIAL MALAR CHEEK
LOCATION DETAILED: LEFT POSTERIOR SHOULDER
LOCATION DETAILED: LEFT SUPERIOR CENTRAL MALAR CHEEK
LOCATION DETAILED: LEFT RIB CAGE
LOCATION DETAILED: RIGHT POSTERIOR SHOULDER
LOCATION DETAILED: RIGHT PROXIMAL PRETIBIAL REGION
LOCATION DETAILED: RIGHT ANTERIOR PROXIMAL THIGH
LOCATION DETAILED: LEFT AXILLARY VAULT
LOCATION DETAILED: LEFT MEDIAL UPPER BACK
LOCATION DETAILED: RIGHT INFRAMAMMARY CREASE (INNER QUADRANT)

## 2025-08-28 ASSESSMENT — LOCATION SIMPLE DESCRIPTION DERM
LOCATION SIMPLE: RIGHT THIGH
LOCATION SIMPLE: RIGHT PRETIBIAL REGION
LOCATION SIMPLE: RIGHT SHOULDER
LOCATION SIMPLE: LEFT SHOULDER
LOCATION SIMPLE: RIGHT CHEEK
LOCATION SIMPLE: LEFT CHEEK
LOCATION SIMPLE: LEFT UPPER BACK
LOCATION SIMPLE: ABDOMEN
LOCATION SIMPLE: RIGHT UPPER BACK
LOCATION SIMPLE: POSTERIOR SCALP
LOCATION SIMPLE: RIGHT AXILLARY VAULT
LOCATION SIMPLE: LEFT PRETIBIAL REGION
LOCATION SIMPLE: LEFT AXILLARY VAULT
LOCATION SIMPLE: RIGHT BREAST
LOCATION SIMPLE: LEFT THIGH

## 2025-08-28 ASSESSMENT — LOCATION ZONE DERM
LOCATION ZONE: TRUNK
LOCATION ZONE: LEG
LOCATION ZONE: AXILLAE
LOCATION ZONE: SCALP
LOCATION ZONE: ARM
LOCATION ZONE: FACE

## 2025-08-28 ASSESSMENT — SEVERITY ASSESSMENT: SEVERITY: MODERATE
